# Patient Record
Sex: MALE | Race: WHITE | NOT HISPANIC OR LATINO | Employment: FULL TIME | URBAN - METROPOLITAN AREA
[De-identification: names, ages, dates, MRNs, and addresses within clinical notes are randomized per-mention and may not be internally consistent; named-entity substitution may affect disease eponyms.]

---

## 2017-12-01 ENCOUNTER — OFFICE VISIT (OUTPATIENT)
Dept: FAMILY MEDICINE | Facility: CLINIC | Age: 47
End: 2017-12-01
Payer: COMMERCIAL

## 2017-12-01 ENCOUNTER — LAB VISIT (OUTPATIENT)
Dept: LAB | Facility: HOSPITAL | Age: 47
End: 2017-12-01
Attending: FAMILY MEDICINE
Payer: COMMERCIAL

## 2017-12-01 VITALS
HEIGHT: 69 IN | DIASTOLIC BLOOD PRESSURE: 84 MMHG | HEART RATE: 76 BPM | WEIGHT: 229.5 LBS | SYSTOLIC BLOOD PRESSURE: 128 MMHG | BODY MASS INDEX: 33.99 KG/M2 | OXYGEN SATURATION: 97 % | TEMPERATURE: 98 F

## 2017-12-01 DIAGNOSIS — B35.1 ONYCHOMYCOSIS: ICD-10-CM

## 2017-12-01 DIAGNOSIS — S22.31XD CLOSED FRACTURE OF ONE RIB OF RIGHT SIDE WITH ROUTINE HEALING: ICD-10-CM

## 2017-12-01 DIAGNOSIS — Z00.00 ANNUAL PHYSICAL EXAM: Primary | ICD-10-CM

## 2017-12-01 DIAGNOSIS — Z00.00 ANNUAL PHYSICAL EXAM: ICD-10-CM

## 2017-12-01 LAB
ALBUMIN SERPL BCP-MCNC: 3.8 G/DL
ALP SERPL-CCNC: 59 U/L
ALT SERPL W/O P-5'-P-CCNC: 57 U/L
ANION GAP SERPL CALC-SCNC: 6 MMOL/L
AST SERPL-CCNC: 26 U/L
BASOPHILS # BLD AUTO: 0.03 K/UL
BASOPHILS NFR BLD: 0.4 %
BILIRUB SERPL-MCNC: 0.5 MG/DL
BUN SERPL-MCNC: 11 MG/DL
CALCIUM SERPL-MCNC: 10.4 MG/DL
CHLORIDE SERPL-SCNC: 108 MMOL/L
CHOLEST SERPL-MCNC: 190 MG/DL
CHOLEST/HDLC SERPL: 4.9 {RATIO}
CO2 SERPL-SCNC: 26 MMOL/L
CREAT SERPL-MCNC: 0.8 MG/DL
DIFFERENTIAL METHOD: ABNORMAL
EOSINOPHIL # BLD AUTO: 0.2 K/UL
EOSINOPHIL NFR BLD: 2.8 %
ERYTHROCYTE [DISTWIDTH] IN BLOOD BY AUTOMATED COUNT: 13.8 %
EST. GFR  (AFRICAN AMERICAN): >60 ML/MIN/1.73 M^2
EST. GFR  (NON AFRICAN AMERICAN): >60 ML/MIN/1.73 M^2
GLUCOSE SERPL-MCNC: 80 MG/DL
HCT VFR BLD AUTO: 42.5 %
HDLC SERPL-MCNC: 39 MG/DL
HDLC SERPL: 20.5 %
HGB BLD-MCNC: 14.2 G/DL
LDLC SERPL CALC-MCNC: 129.6 MG/DL
LYMPHOCYTES # BLD AUTO: 2.8 K/UL
LYMPHOCYTES NFR BLD: 35 %
MCH RBC QN AUTO: 30.9 PG
MCHC RBC AUTO-ENTMCNC: 33.4 G/DL
MCV RBC AUTO: 93 FL
MONOCYTES # BLD AUTO: 0.6 K/UL
MONOCYTES NFR BLD: 8.1 %
NEUTROPHILS # BLD AUTO: 4.2 K/UL
NEUTROPHILS NFR BLD: 53.7 %
NONHDLC SERPL-MCNC: 151 MG/DL
PLATELET # BLD AUTO: 260 K/UL
PMV BLD AUTO: 10.9 FL
POTASSIUM SERPL-SCNC: 4.1 MMOL/L
PROT SERPL-MCNC: 7.2 G/DL
RBC # BLD AUTO: 4.59 M/UL
SODIUM SERPL-SCNC: 140 MMOL/L
TRIGL SERPL-MCNC: 107 MG/DL
TSH SERPL DL<=0.005 MIU/L-ACNC: 1.9 UIU/ML
WBC # BLD AUTO: 7.89 K/UL

## 2017-12-01 PROCEDURE — 99396 PREV VISIT EST AGE 40-64: CPT | Mod: S$GLB,,, | Performed by: FAMILY MEDICINE

## 2017-12-01 PROCEDURE — 80061 LIPID PANEL: CPT

## 2017-12-01 PROCEDURE — 99999 PR PBB SHADOW E&M-EST. PATIENT-LVL III: CPT | Mod: PBBFAC,,, | Performed by: FAMILY MEDICINE

## 2017-12-01 PROCEDURE — 80053 COMPREHEN METABOLIC PANEL: CPT

## 2017-12-01 PROCEDURE — 84443 ASSAY THYROID STIM HORMONE: CPT

## 2017-12-01 PROCEDURE — 36415 COLL VENOUS BLD VENIPUNCTURE: CPT | Mod: PO

## 2017-12-01 PROCEDURE — 85025 COMPLETE CBC W/AUTO DIFF WBC: CPT

## 2017-12-01 RX ORDER — TERBINAFINE HYDROCHLORIDE 250 MG/1
250 TABLET ORAL DAILY
Qty: 90 TABLET | Refills: 0 | Status: SHIPPED | OUTPATIENT
Start: 2017-12-01 | End: 2017-12-31

## 2017-12-01 RX ORDER — ACETAMINOPHEN AND CODEINE PHOSPHATE 300; 30 MG/1; MG/1
TABLET ORAL
COMMUNITY
Start: 2017-10-24 | End: 2018-03-20

## 2017-12-01 RX ORDER — NAPROXEN 500 MG/1
TABLET ORAL
COMMUNITY
Start: 2017-10-24 | End: 2018-04-04

## 2017-12-01 NOTE — PROGRESS NOTES
Subjective:       Patient ID: Juan Couch is a 47 y.o. male.    Chief Complaint: Annual Exam and Letter for School/Work    Patient presents for annual exam. He has fractured his ribs and feels fine. He did this 6 weeks ago and is ready to go back to work. He feels fine and is ready to return.    He was also treated for onychomycosis and feels it is returning and would like re treatment.   He gets dental visits 2 times a year.     History reviewed. No pertinent past medical history.   Past Surgical History:  No date: FRACTURE SURGERY      Comment: tibia, fibula, forarm  Review of patient's family history indicates:    Cancer                         Mother                      Comment: uterine    Social History    Marital status:              Spouse name:                       Years of education:                 Number of children:               Occupational History    None on file    Social History Main Topics    Smoking status: Current Every Day Smoker                                                     Packs/day: 0.75      Years: 22.00          Types: Cigarettes       Smokeless tobacco: Not on file                       Alcohol use: Yes           0.5 oz/week       Standard drinks or equivalent: 1 per week    Drug use: Not on file     Sexual activity: Not on file          Other Topics            Concern    None on file    Social History Narrative    Works as a . He has one daughter.            Review of Systems   Constitutional: Negative for fatigue.   HENT: Negative for hearing loss, rhinorrhea, sneezing and sore throat.    Eyes: Negative for visual disturbance.   Respiratory: Negative for cough, chest tightness, shortness of breath and wheezing.    Cardiovascular: Negative for chest pain, palpitations and leg swelling.   Gastrointestinal: Negative for abdominal pain, constipation, diarrhea, nausea and vomiting.   Endocrine: Negative for polydipsia, polyphagia and polyuria.   Genitourinary:  "Negative for dysuria, frequency, hematuria and urgency.   Musculoskeletal: Negative for arthralgias and back pain.   Skin: Negative for rash.   Neurological: Negative for dizziness, syncope, light-headedness and headaches.       Objective:       Vitals:    12/01/17 1004   BP: 128/84   Pulse: 76   Temp: 97.9 °F (36.6 °C)   TempSrc: Oral   SpO2: 97%   Weight: 104.1 kg (229 lb 8 oz)   Height: 5' 9" (1.753 m)       Physical Exam   Constitutional: He appears well-developed and well-nourished. No distress.   HENT:   Head: Normocephalic.   Right Ear: External ear normal.   Left Ear: External ear normal.   Mouth/Throat: Oropharynx is clear and moist. No oropharyngeal exudate.   Eyes: Conjunctivae are normal.   Neck: Normal range of motion. Neck supple. No thyromegaly present.   Cardiovascular: Normal rate, regular rhythm and normal heart sounds.  Exam reveals no gallop and no friction rub.    No murmur heard.  Pulmonary/Chest: Effort normal and breath sounds normal. No respiratory distress. He has no wheezes. He has no rales.   Abdominal: Soft. Bowel sounds are normal. He exhibits no distension and no mass. There is no tenderness. There is no rebound and no guarding.   Musculoskeletal: He exhibits no edema.   Lymphadenopathy:     He has no cervical adenopathy.   Neurological: He is alert.   Skin: No rash noted. He is not diaphoretic.   Psychiatric: He has a normal mood and affect.       Assessment:       1. Annual physical exam    2. Closed fracture of one rib of right side with routine healing    3. Onychomycosis        Plan:       Juan was seen today for annual exam and letter for school/work.    Diagnoses and all orders for this visit:    Annual physical exam  -     Comprehensive metabolic panel; Future  -     Lipid panel; Future  -     TSH; Future  -     CBC auto differential; Future    Closed fracture of one rib of right side with routine healing    Onychomycosis  -     terbinafine HCl (LAMISIL) 250 mg tablet; Take 1 " tablet (250 mg total) by mouth once daily.      .       Pt was seen on 10/24/17 and diagnosed at [facility] urgent care for a rib fracture and ws given a note by me to return  On 12/4/17.

## 2017-12-04 ENCOUNTER — PATIENT MESSAGE (OUTPATIENT)
Dept: FAMILY MEDICINE | Facility: CLINIC | Age: 47
End: 2017-12-04

## 2018-02-28 ENCOUNTER — TELEPHONE (OUTPATIENT)
Dept: FAMILY MEDICINE | Facility: CLINIC | Age: 48
End: 2018-02-28

## 2018-02-28 DIAGNOSIS — Z72.0 TOBACCO ABUSE: Primary | ICD-10-CM

## 2018-02-28 NOTE — TELEPHONE ENCOUNTER
----- Message from Tammy Doyle sent at 2/28/2018  2:59 PM CST -----  Contact: Self   Patient is requesting a Nicotine Gum or anything Smoking cessation scripts available that can be covered by his insurance. Please call at [phone].

## 2018-03-20 ENCOUNTER — CLINICAL SUPPORT (OUTPATIENT)
Dept: SMOKING CESSATION | Facility: CLINIC | Age: 48
End: 2018-03-20
Payer: COMMERCIAL

## 2018-03-20 DIAGNOSIS — F17.200 NICOTINE DEPENDENCE: Primary | ICD-10-CM

## 2018-03-20 PROCEDURE — 99404 PREV MED CNSL INDIV APPRX 60: CPT | Mod: S$GLB,,,

## 2018-03-20 PROCEDURE — 99999 PR PBB SHADOW E&M-EST. PATIENT-LVL I: CPT | Mod: PBBFAC,,,

## 2018-03-20 RX ORDER — BUPROPION HYDROCHLORIDE 150 MG/1
TABLET, EXTENDED RELEASE ORAL
Qty: 60 TABLET | Refills: 0 | Status: SHIPPED | OUTPATIENT
Start: 2018-03-20 | End: 2018-04-04

## 2018-03-20 RX ORDER — IBUPROFEN 200 MG
1 TABLET ORAL DAILY
Qty: 14 PATCH | Refills: 0 | Status: SHIPPED | OUTPATIENT
Start: 2018-03-20 | End: 2018-04-13 | Stop reason: SDUPTHER

## 2018-04-04 ENCOUNTER — TELEPHONE (OUTPATIENT)
Dept: SMOKING CESSATION | Facility: CLINIC | Age: 48
End: 2018-04-04

## 2018-04-04 NOTE — TELEPHONE ENCOUNTER
Smoking [facility]essation [facility]- called to check on patient, no show for intake appointment. Left message to call back to reschedule [phone] or  [phone].

## 2018-04-13 DIAGNOSIS — F17.200 NICOTINE DEPENDENCE: Primary | ICD-10-CM

## 2018-04-13 RX ORDER — IBUPROFEN 200 MG
1 TABLET ORAL DAILY
Qty: 14 PATCH | Refills: 0 | Status: SHIPPED | OUTPATIENT
Start: 2018-04-13 | End: 2018-04-17 | Stop reason: SDUPTHER

## 2018-04-17 ENCOUNTER — CLINICAL SUPPORT (OUTPATIENT)
Dept: SMOKING CESSATION | Facility: CLINIC | Age: 48
End: 2018-04-17
Payer: COMMERCIAL

## 2018-04-17 DIAGNOSIS — F17.200 NICOTINE DEPENDENCE: Primary | ICD-10-CM

## 2018-04-17 PROCEDURE — 90853 GROUP PSYCHOTHERAPY: CPT | Mod: S$GLB,,,

## 2018-04-17 RX ORDER — IBUPROFEN 200 MG
1 TABLET ORAL DAILY
Qty: 14 PATCH | Refills: 0 | Status: SHIPPED | OUTPATIENT
Start: 2018-04-17 | End: 2018-05-01 | Stop reason: SDUPTHER

## 2018-04-17 RX ORDER — DM/P-EPHED/ACETAMINOPH/DOXYLAM 30-7.5/3
2 LIQUID (ML) ORAL
Qty: 144 LOZENGE | Refills: 0 | Status: SHIPPED | OUTPATIENT
Start: 2018-04-17 | End: 2018-06-19

## 2018-04-17 NOTE — PROGRESS NOTES
Smoking Cessation Group Session #1    [facility]  Date: 4/17/18  Clinical Status of Patient: Outpatient   Length of Service and Code: 60 minutes - 34463   Number in Attendance: 2  Group Activities/Focus of Group:  Sharing last weeks challenges, triggers, and coping activities to remain quit and/ or keep making progress toward cessation, completion of TCRS (Tobacco Cessation Rating Scale) learned addiction model, personal reasons for quitting, medications, goals, quit date.        Target symptoms:  withdrawal and medication side effects             The following were rated moderate (3) to severe (4) on TCRS:       Moderate 3: none      Severe 4:   none  Patient's Response to Intervention: Active participation, self-disclosure, supportive of group and peers.   Pt  continues to smoke 10 cigs/day. Pt is no longer taking  tobacco cessation medication of 150 mg Wellbutrin SR  BID, he said he just didn't like it. He remains on 21 mg nicotine patch QD.   No adverse effects/mental changes noted at this time. Discussed symptoms rated as 3 or 4 on TCRS scale : craving , added 2 mg nicotine lozenge.  Pt asked to reduce smoking rate by 2 cigs/day. Pt's exhaled carbon monoxide level was 10 ppm per smokerlyzer (0-6 ppm non-smoker). Will see pt back in group 2 weeks.    Progress Toward Goals and Other Mental Status Changes: Pt will continue with rate reduction plan and wait times prior to smoking. The patient denies any abnormal behavioral or mental changes at this time.      Diagnosis: Z72.0  Plan:The patient will continue with group therapy sessions and tobacco cessation medication monitoring by CTTS.   Return to Clinic: 2 weeks

## 2018-04-24 ENCOUNTER — TELEPHONE (OUTPATIENT)
Dept: FAMILY MEDICINE | Facility: CLINIC | Age: 48
End: 2018-04-24

## 2018-04-24 NOTE — TELEPHONE ENCOUNTER
----- Message from Isadora Biggs sent at 4/24/2018  2:28 PM CDT -----  Contact: Self   Patient says his medication didn't really work for him and he would like to know if he can get a refill or another medication. Please call patient at [phone].        terbinafine (LAMISIL) 250 mg tablet    [facility] [place] [address] AT [address]

## 2018-04-24 NOTE — TELEPHONE ENCOUNTER
Patient stated he feels that medication - Lamisil - is not working.  Stated he used the amount prescribed and a refill with not change in situation.  Would like to know if Dr. Santana would recommend a repeat of the medication at an increased dose or if another medication can be prescribed.  Please advise.

## 2018-05-01 ENCOUNTER — CLINICAL SUPPORT (OUTPATIENT)
Dept: SMOKING CESSATION | Facility: CLINIC | Age: 48
End: 2018-05-01
Payer: COMMERCIAL

## 2018-05-01 DIAGNOSIS — F17.200 NICOTINE DEPENDENCE: Primary | ICD-10-CM

## 2018-05-01 PROCEDURE — 99407 BEHAV CHNG SMOKING > 10 MIN: CPT | Mod: S$GLB,,,

## 2018-05-01 RX ORDER — IBUPROFEN 200 MG
1 TABLET ORAL DAILY
Qty: 14 PATCH | Refills: 0 | Status: SHIPPED | OUTPATIENT
Start: 2018-05-01 | End: 2018-05-31

## 2018-05-08 ENCOUNTER — CLINICAL SUPPORT (OUTPATIENT)
Dept: SMOKING CESSATION | Facility: CLINIC | Age: 48
End: 2018-05-08
Payer: COMMERCIAL

## 2018-05-08 DIAGNOSIS — F17.200 NICOTINE DEPENDENCE: Primary | ICD-10-CM

## 2018-05-08 PROCEDURE — 99407 BEHAV CHNG SMOKING > 10 MIN: CPT | Mod: S$GLB,,,

## 2018-06-04 ENCOUNTER — CLINICAL SUPPORT (OUTPATIENT)
Dept: SMOKING CESSATION | Facility: CLINIC | Age: 48
End: 2018-06-04
Payer: COMMERCIAL

## 2018-06-04 DIAGNOSIS — F17.200 NICOTINE DEPENDENCE: Primary | ICD-10-CM

## 2018-06-04 PROCEDURE — 99407 BEHAV CHNG SMOKING > 10 MIN: CPT | Mod: S$GLB,,,

## 2018-06-04 RX ORDER — IBUPROFEN 200 MG
1 TABLET ORAL DAILY
Qty: 14 PATCH | Refills: 0 | Status: SHIPPED | OUTPATIENT
Start: 2018-06-04 | End: 2018-06-19 | Stop reason: SDUPTHER

## 2018-06-19 ENCOUNTER — CLINICAL SUPPORT (OUTPATIENT)
Dept: SMOKING CESSATION | Facility: CLINIC | Age: 48
End: 2018-06-19
Payer: COMMERCIAL

## 2018-06-19 DIAGNOSIS — F17.200 NICOTINE DEPENDENCE: ICD-10-CM

## 2018-06-19 PROCEDURE — 99403 PREV MED CNSL INDIV APPRX 45: CPT | Mod: S$GLB,,,

## 2018-06-19 PROCEDURE — 99999 PR PBB SHADOW E&M-EST. PATIENT-LVL II: CPT | Mod: PBBFAC,,,

## 2018-06-19 RX ORDER — MICONAZOLE NITRATE 2 %
CREAM (GRAM) TOPICAL
Qty: 170 EACH | Refills: 0 | Status: SHIPPED | OUTPATIENT
Start: 2018-06-19 | End: 2019-04-30

## 2018-06-19 RX ORDER — IBUPROFEN 200 MG
1 TABLET ORAL DAILY
Qty: 28 PATCH | Refills: 0 | Status: SHIPPED | OUTPATIENT
Start: 2018-06-19 | End: 2018-07-17

## 2018-06-22 NOTE — PROGRESS NOTES
Individual Follow-Up Form    6/22/2018    Quit Date: 7/1/18    Clinical Status of Patient: Outpatient    Length of Service: 45 minutes    Continuing Medication: yes  Patches    Other Medications: gum     Target Symptoms: Withdrawal and medication side effects. The following were  rated moderate (3) to severe (4) on TCRS:  · Moderate (3): none  · Severe (4): none    Comments: Pt continues to smoke 2 cigs/day. Pt remains on tobacco cessation medication of  21 mg nicotine patch QD and 2mg nicotine lozenge PRN (1-2 per hour in place of cigarettes.) No adverse effects noted at this time. Pt doing well with rate reduction and wait times prior to smoking. Reviewed coping strategies/habitual behavior/relapse prevention with patient. Exhaled carbon monoxide level was () ppm per Smokerlyzer  ( non- smoker = 0-5 ppm .)  Will see pt back in office in 2 weeks      Diagnosis: F17.200    Next Visit: 2 weeks

## 2018-07-10 ENCOUNTER — TELEPHONE (OUTPATIENT)
Dept: SMOKING CESSATION | Facility: CLINIC | Age: 48
End: 2018-07-10

## 2018-07-16 ENCOUNTER — TELEPHONE (OUTPATIENT)
Dept: SMOKING CESSATION | Facility: CLINIC | Age: 48
End: 2018-07-16

## 2018-07-28 ENCOUNTER — CLINICAL SUPPORT (OUTPATIENT)
Dept: SMOKING CESSATION | Facility: CLINIC | Age: 48
End: 2018-07-28
Payer: COMMERCIAL

## 2018-07-28 DIAGNOSIS — F17.200 NICOTINE DEPENDENCE: Primary | ICD-10-CM

## 2018-07-28 PROCEDURE — 99407 BEHAV CHNG SMOKING > 10 MIN: CPT | Mod: S$GLB,,, | Performed by: INTERNAL MEDICINE

## 2018-07-28 NOTE — PROGRESS NOTES
Spoke with patient today in regard to smoking cessation progress for 3 month follow up, he states not tobacco free. Patient states he was able to become 80% quit, he is about to go on vacation and not ready to schedule an appointment to return to the program at this time. Commended patient on the accomplishment. Informed patient of benefit period, future follow ups, and contact information if any further help or support is needed. Will complete smart form for 3 month follow up on Quit attempt #1.

## 2018-10-29 ENCOUNTER — OFFICE VISIT (OUTPATIENT)
Dept: FAMILY MEDICINE | Facility: CLINIC | Age: 48
End: 2018-10-29
Payer: COMMERCIAL

## 2018-10-29 VITALS
WEIGHT: 231.5 LBS | BODY MASS INDEX: 34.29 KG/M2 | OXYGEN SATURATION: 97 % | TEMPERATURE: 98 F | DIASTOLIC BLOOD PRESSURE: 70 MMHG | HEIGHT: 69 IN | HEART RATE: 84 BPM | SYSTOLIC BLOOD PRESSURE: 124 MMHG

## 2018-10-29 DIAGNOSIS — Z00.00 ANNUAL PHYSICAL EXAM: ICD-10-CM

## 2018-10-29 DIAGNOSIS — J20.9 ACUTE BRONCHITIS, UNSPECIFIED ORGANISM: Primary | ICD-10-CM

## 2018-10-29 PROCEDURE — 90471 IMMUNIZATION ADMIN: CPT | Mod: S$GLB,,, | Performed by: FAMILY MEDICINE

## 2018-10-29 PROCEDURE — 90732 PPSV23 VACC 2 YRS+ SUBQ/IM: CPT | Mod: S$GLB,,, | Performed by: FAMILY MEDICINE

## 2018-10-29 PROCEDURE — 99999 PR PBB SHADOW E&M-EST. PATIENT-LVL III: CPT | Mod: PBBFAC,,, | Performed by: FAMILY MEDICINE

## 2018-10-29 PROCEDURE — 99396 PREV VISIT EST AGE 40-64: CPT | Mod: 25,S$GLB,, | Performed by: FAMILY MEDICINE

## 2018-10-29 RX ORDER — DOXYCYCLINE HYCLATE 100 MG
100 TABLET ORAL 2 TIMES DAILY
Qty: 14 TABLET | Refills: 0 | Status: SHIPPED | OUTPATIENT
Start: 2018-10-29 | End: 2019-11-05 | Stop reason: ALTCHOICE

## 2018-10-29 NOTE — PROGRESS NOTES
"Subjective:       Patient ID: Juan Couch is a 48 y.o. male.    Chief Complaint: Annual Exam    Patient presents for an annual exam. He is trying to quit smoking. He has been coughing for the last week. He has been coughing up brown phlegm. He has no fever or chills.       Review of Systems   Constitutional: Negative for activity change and unexpected weight change.   HENT: Positive for hearing loss and rhinorrhea. Negative for trouble swallowing.    Eyes: Negative for discharge and visual disturbance.   Respiratory: Positive for cough. Negative for chest tightness, shortness of breath and wheezing.    Cardiovascular: Negative for chest pain, palpitations and leg swelling.   Gastrointestinal: Negative for abdominal pain, blood in stool, constipation, diarrhea, nausea and vomiting.   Endocrine: Negative for polydipsia and polyuria.   Genitourinary: Negative for difficulty urinating, hematuria and urgency.   Musculoskeletal: Negative for arthralgias, joint swelling and neck pain.   Neurological: Negative for dizziness, syncope, weakness, light-headedness and headaches.   Psychiatric/Behavioral: Negative for confusion and dysphoric mood.       Objective:       Vitals:    10/29/18 1509   BP: 124/70   Pulse: 84   Temp: 98.2 °F (36.8 °C)   TempSrc: Oral   SpO2: 97%   Weight: 105 kg (231 lb 7.7 oz)   Height: 5' 9" (1.753 m)       Physical Exam   Constitutional: He is oriented to person, place, and time. He appears well-developed and well-nourished. No distress.   HENT:   Head: Normocephalic.   Right Ear: External ear normal.   Left Ear: External ear normal.   Mouth/Throat: Oropharynx is clear and moist. No oropharyngeal exudate.   Eyes: Conjunctivae are normal.   Neck: Normal range of motion. Neck supple. No thyromegaly present.   Cardiovascular: Normal rate, regular rhythm and normal heart sounds. Exam reveals no gallop and no friction rub.   No murmur heard.  Pulmonary/Chest: Effort normal and breath sounds normal. No " stridor. No respiratory distress. He has no wheezes. He has no rales.   Abdominal: Soft. Bowel sounds are normal. He exhibits no distension and no mass. There is no tenderness. There is no rebound and no guarding.   Musculoskeletal: He exhibits no edema.   Lymphadenopathy:     He has no cervical adenopathy.   Neurological: He is alert and oriented to person, place, and time.   Skin: No rash noted. He is not diaphoretic.   Psychiatric: He has a normal mood and affect.       Assessment:       1. Acute bronchitis, unspecified organism    2. Annual physical exam        Plan:       Nelly Martinez was seen today for annual exam.    Diagnoses and all orders for this visit:    Acute bronchitis, unspecified organism  -     doxycycline (VIBRA-TABS) 100 MG tablet; Take 1 tablet (100 mg total) by mouth 2 (two) times daily.    Annual physical exam  -     CBC auto differential; Future  -     Lipid panel; Future  -     Comprehensive metabolic panel; Future    Other orders  -     Influenza - Quadrivalent (3 years & older) (PF)  -     (In Office Administered) Pneumococcal Polysaccharide Vaccine (23 Valent) (SQ/IM)      \

## 2018-11-07 ENCOUNTER — LAB VISIT (OUTPATIENT)
Dept: LAB | Facility: HOSPITAL | Age: 48
End: 2018-11-07
Attending: FAMILY MEDICINE
Payer: COMMERCIAL

## 2018-11-07 DIAGNOSIS — Z00.00 ANNUAL PHYSICAL EXAM: ICD-10-CM

## 2018-11-07 LAB
ALBUMIN SERPL BCP-MCNC: 4 G/DL
ALP SERPL-CCNC: 57 U/L
ALT SERPL W/O P-5'-P-CCNC: 64 U/L
ANION GAP SERPL CALC-SCNC: 6 MMOL/L
AST SERPL-CCNC: 27 U/L
BASOPHILS # BLD AUTO: 0.06 K/UL
BASOPHILS NFR BLD: 0.7 %
BILIRUB SERPL-MCNC: 0.6 MG/DL
BUN SERPL-MCNC: 13 MG/DL
CALCIUM SERPL-MCNC: 11.4 MG/DL
CHLORIDE SERPL-SCNC: 108 MMOL/L
CHOLEST SERPL-MCNC: 201 MG/DL
CHOLEST/HDLC SERPL: 5.3 {RATIO}
CO2 SERPL-SCNC: 26 MMOL/L
CREAT SERPL-MCNC: 0.9 MG/DL
DIFFERENTIAL METHOD: NORMAL
EOSINOPHIL # BLD AUTO: 0.2 K/UL
EOSINOPHIL NFR BLD: 2.7 %
ERYTHROCYTE [DISTWIDTH] IN BLOOD BY AUTOMATED COUNT: 13.8 %
EST. GFR  (AFRICAN AMERICAN): >60 ML/MIN/1.73 M^2
EST. GFR  (NON AFRICAN AMERICAN): >60 ML/MIN/1.73 M^2
GLUCOSE SERPL-MCNC: 93 MG/DL
HCT VFR BLD AUTO: 42.4 %
HDLC SERPL-MCNC: 38 MG/DL
HDLC SERPL: 18.9 %
HGB BLD-MCNC: 14.3 G/DL
LDLC SERPL CALC-MCNC: 137.6 MG/DL
LYMPHOCYTES # BLD AUTO: 2.7 K/UL
LYMPHOCYTES NFR BLD: 33.7 %
MCH RBC QN AUTO: 30.6 PG
MCHC RBC AUTO-ENTMCNC: 33.7 G/DL
MCV RBC AUTO: 91 FL
MONOCYTES # BLD AUTO: 0.5 K/UL
MONOCYTES NFR BLD: 6.7 %
NEUTROPHILS # BLD AUTO: 4.5 K/UL
NEUTROPHILS NFR BLD: 56.2 %
NONHDLC SERPL-MCNC: 163 MG/DL
PLATELET # BLD AUTO: 276 K/UL
PMV BLD AUTO: 11.4 FL
POTASSIUM SERPL-SCNC: 4.2 MMOL/L
PROT SERPL-MCNC: 7.3 G/DL
RBC # BLD AUTO: 4.68 M/UL
SODIUM SERPL-SCNC: 140 MMOL/L
TRIGL SERPL-MCNC: 127 MG/DL
WBC # BLD AUTO: 8.07 K/UL

## 2018-11-07 PROCEDURE — 80061 LIPID PANEL: CPT

## 2018-11-07 PROCEDURE — 36415 COLL VENOUS BLD VENIPUNCTURE: CPT | Mod: PO

## 2018-11-07 PROCEDURE — 85025 COMPLETE CBC W/AUTO DIFF WBC: CPT

## 2018-11-07 PROCEDURE — 80053 COMPREHEN METABOLIC PANEL: CPT

## 2018-11-08 ENCOUNTER — TELEPHONE (OUTPATIENT)
Dept: FAMILY MEDICINE | Facility: CLINIC | Age: 48
End: 2018-11-08

## 2018-11-08 DIAGNOSIS — E83.52 HYPERCALCEMIA: ICD-10-CM

## 2018-11-08 DIAGNOSIS — R79.89 ELEVATED LFTS: Primary | ICD-10-CM

## 2018-11-08 NOTE — TELEPHONE ENCOUNTER
----- Message from Odessa Santana MD sent at 11/7/2018  4:28 PM CST -----  YES, YES, AND MONITOR LFT

## 2018-11-08 NOTE — TELEPHONE ENCOUNTER
Patients calcium was high so we need to check her vit d and PTH  Liver enzyme high so repeat in 2 months. Avoid excessive use of tylenol products and alcohol.       Notes recorded by Odessa Santana MD on 11/7/2018 at 4:28 PM CST  YES, YES, AND MONITOR LFT  ------    Notes recorded by DEANDRA Stephen on 11/7/2018 at 2:40 PM CST  Vit d? Pth? LFT stable so just watch it?

## 2018-11-15 ENCOUNTER — TELEPHONE (OUTPATIENT)
Dept: FAMILY MEDICINE | Facility: CLINIC | Age: 48
End: 2018-11-15

## 2018-11-15 NOTE — TELEPHONE ENCOUNTER
----- Message from Medina Briones sent at 11/14/2018  4:53 PM CST -----  Contact: pt            Name of Who is Calling: pt      What is the request in detail: pt returned the nurse's phone call. Call pt      Can the clinic reply by [facility]: no      What Number to Call Back if not in [facility]: [phone]

## 2018-11-16 ENCOUNTER — LAB VISIT (OUTPATIENT)
Dept: LAB | Facility: HOSPITAL | Age: 48
End: 2018-11-16
Attending: FAMILY MEDICINE
Payer: COMMERCIAL

## 2018-11-16 DIAGNOSIS — E83.52 HYPERCALCEMIA: ICD-10-CM

## 2018-11-16 LAB
25(OH)D3+25(OH)D2 SERPL-MCNC: 26 NG/ML
PTH-INTACT SERPL-MCNC: 115.1 PG/ML

## 2018-11-16 PROCEDURE — 83970 ASSAY OF PARATHORMONE: CPT

## 2018-11-16 PROCEDURE — 36415 COLL VENOUS BLD VENIPUNCTURE: CPT | Mod: PO

## 2018-11-16 PROCEDURE — 82306 VITAMIN D 25 HYDROXY: CPT

## 2018-11-19 DIAGNOSIS — E21.3 HYPERPARATHYROIDISM: Primary | ICD-10-CM

## 2018-11-19 DIAGNOSIS — E55.9 HYPOVITAMINOSIS D: ICD-10-CM

## 2018-11-19 RX ORDER — ERGOCALCIFEROL 1.25 MG/1
50000 CAPSULE ORAL
Qty: 12 CAPSULE | Refills: 0 | Status: SHIPPED | OUTPATIENT
Start: 2018-11-19 | End: 2019-11-15 | Stop reason: SDUPTHER

## 2018-12-06 ENCOUNTER — OFFICE VISIT (OUTPATIENT)
Dept: ENDOCRINOLOGY | Facility: CLINIC | Age: 48
End: 2018-12-06
Payer: COMMERCIAL

## 2018-12-06 VITALS
DIASTOLIC BLOOD PRESSURE: 80 MMHG | WEIGHT: 231.5 LBS | HEIGHT: 69 IN | BODY MASS INDEX: 34.29 KG/M2 | SYSTOLIC BLOOD PRESSURE: 140 MMHG | RESPIRATION RATE: 18 BRPM | HEART RATE: 76 BPM

## 2018-12-06 DIAGNOSIS — E55.9 VITAMIN D DEFICIENCY: ICD-10-CM

## 2018-12-06 DIAGNOSIS — E21.3 HYPERPARATHYROIDISM: Primary | ICD-10-CM

## 2018-12-06 PROCEDURE — 99999 PR PBB SHADOW E&M-EST. PATIENT-LVL III: CPT | Mod: PBBFAC,,, | Performed by: INTERNAL MEDICINE

## 2018-12-06 PROCEDURE — 99244 OFF/OP CNSLTJ NEW/EST MOD 40: CPT | Mod: S$GLB,,, | Performed by: INTERNAL MEDICINE

## 2018-12-06 NOTE — PROGRESS NOTES
ENDOCRINOLOGY INITIAL VISIT    Juan Couch is a 48 y.o. male referred by Danette Cuellar for evaluation of primary hyperparathyroidism.    Calcium first noted elevated in our system in 2014, highest calcium 11.4  PTH checked 11/2018 and found to be elevated at 115    He denies known history of hypercalcemia    Fatigue: some in midafternoon but otherwise denies  Anorexia/nausea/vomiting: denies  Constipation: denies  Cognitive/memory changes: +memory loss, difficulty with word recall over past few years  Mood changes: denies  Thirst: increased  Polyuria/polydipsia: increased throughout the day  Musculoskeletal pain: low back pain over past few weeks; had muscle spasms initially but those have now resolved    Fractures: broken rib after fall down stairs 11/2017  Kidney stones: denies    Thiazide/lithium use: denies    Calcium intake:   Supplements: denies   Diet: few servings  Vitamin D supplements: took 50,000 K four tabs in one week rather than weekly as misunderstood directions; last 2 weeks ago    History of XRT to head/neck: denies    Family history of hypercalcemia/hyperparathyroidism: denies    Family history of thyroid cancer, pituitary/pancreas/neuroendocrine/adrenal tumors: denies      REVIEW OF SYSTEMS  Constitutional: Weight stable but trying to lose. midafternoon fatigue  Temperature: Denies heat/cold intolerance  Eyes: No blurry vision, loss of vision, diplopia.  ENT: No voice alterations, no problems swallowing.  Thyroid: No masses in neck area.  CV: No chest pain, palpitations, normal blood pressure, no swelling of the lower extremities.  Pulm: No cough, no shortness of breath, no wheezing.  GI: No abdominal pain, no nausea/vomiting, regular bowel movements.  MSK: left lower flank pain  Neuro: No headaches. Memory loss  Skin: No skin lesions.  Endo:+ polyuria/polydipsia.    MEDICATION    Current Outpatient Medications:     ergocalciferol (ERGOCALCIFEROL) 50,000 unit Cap, Take 1 capsule (50,000 Units total)  "by mouth every 7 days., Disp: 12 capsule, Rfl: 0    doxycycline (VIBRA-TABS) 100 MG tablet, Take 1 tablet (100 mg total) by mouth 2 (two) times daily., Disp: 14 tablet, Rfl: 0    nicotine, polacrilex, (NICORETTE) 2 mg Gum, 1-2 per hour in place of cigarettes maximum of 15 per day., Disp: 170 each, Rfl: 0    ALLERGIES  Review of patient's allergies indicates:  No Known Allergies      PHYSICAL EXAM  BP (!) 140/80   Pulse 76   Resp 18   Ht 5' 9" (1.753 m)   Wt 105 kg (231 lb 7.7 oz)   BMI 34.18 kg/m²   Body mass index is 34.18 kg/m².  General Appearance:  Well appearing, pleasant, NAD  Skin:  no rashes or lesions  HEENT:  EOMI, MMM  Neck:  No thyromegaly or nodules, no LAD  Chest and Lungs:  CTAB, no wheezes/rales/rhonchi  Cardiovascular:  RRR, nml S1, S2.  No m/r/g  Extremities:  no lower extremity edema  Neurologic:  A&O x3  Psychiatric:  normal mood and affect    LABORATORY  Component      Latest Ref Rng & Units 11/16/2018 11/7/2018   Sodium      136 - 145 mmol/L  140   Potassium      3.5 - 5.1 mmol/L  4.2   Chloride      95 - 110 mmol/L  108   CO2      23 - 29 mmol/L  26   Glucose      70 - 110 mg/dL  93   BUN, Bld      6 - 20 mg/dL  13   Creatinine      0.5 - 1.4 mg/dL  0.9   Calcium      8.7 - 10.5 mg/dL  11.4 (H)   Total Protein      6.0 - 8.4 g/dL  7.3   Albumin      3.5 - 5.2 g/dL  4.0   Total Bilirubin      0.1 - 1.0 mg/dL  0.6   Alkaline Phosphatase      55 - 135 U/L  57   AST      10 - 40 U/L  27   ALT      10 - 44 U/L  64 (H)   Anion Gap      8 - 16 mmol/L  6 (L)   eGFR if African American      >60 mL/min/1.73 m:2  >60   eGFR if non African American      >60 mL/min/1.73 m:2  >60   Vit D, 25-Hydroxy      30 - 96 ng/mL 26 (L)    PTH      9.0 - 77.0 pg/mL 115.1 (H)        ASSESSMENT/PLAN  1. Hyperparathyroidism    2. Vitamin D deficiency      PTH-mediated hypercalcemia:   Suspect primary hyperparathyroidism  To confirm the diagnosis and in anticipation of surgery, I recommend:   - measure 24 hour urine " calcium and creatinine to exclude FHH although low suspicion for this given degree of calcium elevation  - Repeat renal function panel and iPTH on day completes urine collection    Then plan for:  - Neck ultrasound to evaluate for parathyroid adenoma and to look for presence of thyroid nodules   - Nuclear medicine parathyroid scan for localization.     We discussed that surgery is typically recommended in the presence of symptoms due to hypercalcemia or kidney stones.  In addition, the criteria for surgical intervention in asymptomatic primary hyperparathyroidism per the 2014 consensus guidelines include age <50; serum calcium 1 mg/dl or more above the upper limit of normal; creatinine clearance <60 ml/min, 24-hurine calcium excretion >400 mg/d and increased calcium-containing stone risk by biochemical stone risk analysis, or presence of nephrolithiasis or nephrocalcinosis by x-ray, ultrasound, or CT; or osteoporosis. Given his age and degree of calcium elevation, meets criteria. He is interested in surgery and following above work-up will refer.      Metabolic bone health:   Discontinue high dose vitamin D, start D3 1000 units daily  Recommend usual daily intake of calcium  Continue with water intake for hydration    RTC 6 months    Maren Strange MD

## 2018-12-06 NOTE — LETTER
December 6, 2018      DEANDRA Stephen  [address] [place] [address]           [facility] - Endocrinology  [address]  [place] [address]  Phone: [phone]          Patient: Juan Couch   MR Number: [id number]   YOB: 1970   Date of Visit: 12/6/2018       Dear Danette Cuellar:    Thank you for referring Juan Couch to me for evaluation. Attached you will find relevant portions of my assessment and plan of care.    If you have questions, please do not hesitate to call me. I look forward to following Juan Couch along with you.    Sincerely,    Maren Strange MD    Enclosure  CC:  No Recipients    If you would like to receive this communication electronically, please contact [email or web address] or [phone] to request more information on [employer] Link access.    For providers and/or their staff who would like to refer a patient to [facility], please contact us through our one-stop-shop provider referral line, Rolly Olmos, at [phone].    If you feel you have received this communication in error or would no longer like to receive these types of communications, please e-mail [email or web address]

## 2018-12-13 ENCOUNTER — LAB VISIT (OUTPATIENT)
Dept: LAB | Facility: HOSPITAL | Age: 48
End: 2018-12-13
Attending: INTERNAL MEDICINE
Payer: COMMERCIAL

## 2018-12-13 ENCOUNTER — TELEPHONE (OUTPATIENT)
Dept: FAMILY MEDICINE | Facility: CLINIC | Age: 48
End: 2018-12-13

## 2018-12-13 DIAGNOSIS — E55.9 HYPOVITAMINOSIS D: ICD-10-CM

## 2018-12-13 DIAGNOSIS — R79.89 ELEVATED LFTS: ICD-10-CM

## 2018-12-13 DIAGNOSIS — E21.3 HYPERPARATHYROIDISM: ICD-10-CM

## 2018-12-13 LAB
25(OH)D3+25(OH)D2 SERPL-MCNC: 24 NG/ML
ALBUMIN SERPL BCP-MCNC: 4 G/DL
ALBUMIN SERPL BCP-MCNC: 4.1 G/DL
ALP SERPL-CCNC: 58 U/L
ALT SERPL W/O P-5'-P-CCNC: 45 U/L
ANION GAP SERPL CALC-SCNC: 8 MMOL/L
AST SERPL-CCNC: 19 U/L
BILIRUB DIRECT SERPL-MCNC: 0.2 MG/DL
BILIRUB SERPL-MCNC: 0.4 MG/DL
BUN SERPL-MCNC: 21 MG/DL
CALCIUM 24H UR-MRATE: 13 MG/HR
CALCIUM SERPL-MCNC: 11.3 MG/DL
CALCIUM UR-MCNC: 25.1 MG/DL
CALCIUM URINE (MG/SPEC): 301 MG/SPEC
CHLORIDE SERPL-SCNC: 106 MMOL/L
CO2 SERPL-SCNC: 27 MMOL/L
CREAT 24H UR-MRATE: 58 MG/HR
CREAT SERPL-MCNC: 1.2 MG/DL
CREAT UR-MCNC: 116 MG/DL
CREATININE, URINE (MG/SPEC): 1392 MG/SPEC
EST. GFR  (AFRICAN AMERICAN): >60 ML/MIN/1.73 M^2
EST. GFR  (NON AFRICAN AMERICAN): >60 ML/MIN/1.73 M^2
GLUCOSE SERPL-MCNC: 111 MG/DL
PHOSPHATE SERPL-MCNC: 2.5 MG/DL
POTASSIUM SERPL-SCNC: 4.6 MMOL/L
PROT SERPL-MCNC: 7.6 G/DL
PTH-INTACT SERPL-MCNC: 153 PG/ML
SODIUM SERPL-SCNC: 141 MMOL/L
URINE COLLECTION DURATION: 24 HR
URINE COLLECTION DURATION: 24 HR
URINE VOLUME: 1200 ML
URINE VOLUME: 1200 ML

## 2018-12-13 PROCEDURE — 36415 COLL VENOUS BLD VENIPUNCTURE: CPT | Mod: PO

## 2018-12-13 PROCEDURE — 82570 ASSAY OF URINE CREATININE: CPT

## 2018-12-13 PROCEDURE — 80076 HEPATIC FUNCTION PANEL: CPT

## 2018-12-13 PROCEDURE — 82306 VITAMIN D 25 HYDROXY: CPT

## 2018-12-13 PROCEDURE — 83970 ASSAY OF PARATHORMONE: CPT

## 2018-12-13 PROCEDURE — 82340 ASSAY OF CALCIUM IN URINE: CPT

## 2018-12-13 PROCEDURE — 80069 RENAL FUNCTION PANEL: CPT

## 2018-12-13 NOTE — TELEPHONE ENCOUNTER
----- Message from Tammy Doyle sent at 12/13/2018  2:16 PM CST -----  Contact: Self   Pt is asking to speak with office in ref to having a liver test. Says he was told he would be contacted in a few weeks to follow up but havent heard anything back. Please call at [phone].

## 2018-12-13 NOTE — TELEPHONE ENCOUNTER
Returned call. LVM that Endocrinologist provider ordered the testing and left detailed message informing patient of number to scheduling dept ([phone]) to get scheduled for tests. Informed patient to give them a call to get scheduled.

## 2019-04-04 ENCOUNTER — CLINICAL SUPPORT (OUTPATIENT)
Dept: SMOKING CESSATION | Facility: CLINIC | Age: 49
End: 2019-04-04
Payer: COMMERCIAL

## 2019-04-04 DIAGNOSIS — F17.200 NICOTINE DEPENDENCE: Primary | ICD-10-CM

## 2019-04-04 PROCEDURE — 99407 PR TOBACCO USE CESSATION INTENSIVE >10 MINUTES: ICD-10-PCS | Mod: S$GLB,,,

## 2019-04-04 PROCEDURE — 99407 BEHAV CHNG SMOKING > 10 MIN: CPT | Mod: S$GLB,,,

## 2019-04-04 NOTE — PROGRESS NOTES
Spoke with patient today in regards to smoking cessation progress for 12 month follow up,  states not tobacco free at this time. Patient has scheduled an appointment for Quit #2 with his wife who is also not tobacco free at this time.. Informed patient of benefit period, future follow ups, and contact information. Will complete / resolve episode and complete smart form for Quit attempt #1.

## 2019-04-30 ENCOUNTER — CLINICAL SUPPORT (OUTPATIENT)
Dept: SMOKING CESSATION | Facility: CLINIC | Age: 49
End: 2019-04-30
Payer: COMMERCIAL

## 2019-04-30 DIAGNOSIS — F17.200 NICOTINE DEPENDENCE: Primary | ICD-10-CM

## 2019-04-30 PROCEDURE — 99999 PR PBB SHADOW E&M-EST. PATIENT-LVL I: CPT | Mod: PBBFAC,,,

## 2019-04-30 PROCEDURE — 99404 PR PREVENT COUNSEL,INDIV,60 MIN: ICD-10-PCS | Mod: S$GLB,,,

## 2019-04-30 PROCEDURE — 99999 PR PBB SHADOW E&M-EST. PATIENT-LVL I: ICD-10-PCS | Mod: PBBFAC,,,

## 2019-04-30 PROCEDURE — 99404 PREV MED CNSL INDIV APPRX 60: CPT | Mod: S$GLB,,,

## 2019-04-30 RX ORDER — MICONAZOLE NITRATE 2 %
CREAM (GRAM) TOPICAL
Qty: 170 EACH | Refills: 0 | Status: SHIPPED | OUTPATIENT
Start: 2019-04-30 | End: 2020-07-24 | Stop reason: SDUPTHER

## 2019-04-30 RX ORDER — BUPROPION HYDROCHLORIDE 150 MG/1
TABLET, EXTENDED RELEASE ORAL
Qty: 60 TABLET | Refills: 0 | Status: SHIPPED | OUTPATIENT
Start: 2019-04-30 | End: 2019-05-04 | Stop reason: SDUPTHER

## 2019-04-30 RX ORDER — IBUPROFEN 200 MG
1 TABLET ORAL DAILY
Qty: 28 PATCH | Refills: 0 | Status: SHIPPED | OUTPATIENT
Start: 2019-04-30 | End: 2019-05-04 | Stop reason: SDUPTHER

## 2019-04-30 NOTE — Clinical Note
Patient will be participating in biweekly tobacco cessation meetings and will begin the prescribed tobacco cessation medication regime of Wellbutrin SR BID and 21 mg nicotine patch QD  Patient denies any low moods or SI/HI .  He/She currently smokes 15-20 cigarettes per day.  Pt started on rate reduction and wait time of 15 min prior to smoking. Pt's exhaled carbon monoxide level was  26 ppm as per Smokerlyzer. (non- smoker = 0-5 ppm.) Will see pt back in office in 2 weeks.

## 2019-05-04 DIAGNOSIS — F17.200 NICOTINE DEPENDENCE: Primary | ICD-10-CM

## 2019-05-04 RX ORDER — BUPROPION HYDROCHLORIDE 150 MG/1
TABLET, EXTENDED RELEASE ORAL
Qty: 60 TABLET | Refills: 0 | Status: SHIPPED | OUTPATIENT
Start: 2019-05-04 | End: 2019-11-05

## 2019-05-04 RX ORDER — IBUPROFEN 200 MG
1 TABLET ORAL DAILY
Qty: 28 PATCH | Refills: 0 | Status: SHIPPED | OUTPATIENT
Start: 2019-05-04 | End: 2019-06-03

## 2019-05-14 ENCOUNTER — CLINICAL SUPPORT (OUTPATIENT)
Dept: SMOKING CESSATION | Facility: CLINIC | Age: 49
End: 2019-05-14
Payer: COMMERCIAL

## 2019-05-14 DIAGNOSIS — F17.200 NICOTINE DEPENDENCE: Primary | ICD-10-CM

## 2019-05-14 PROCEDURE — 99407 PR TOBACCO USE CESSATION INTENSIVE >10 MINUTES: ICD-10-PCS | Mod: S$GLB,,,

## 2019-05-14 PROCEDURE — 99407 BEHAV CHNG SMOKING > 10 MIN: CPT | Mod: S$GLB,,,

## 2019-10-11 ENCOUNTER — TELEPHONE (OUTPATIENT)
Dept: FAMILY MEDICINE | Facility: CLINIC | Age: 49
End: 2019-10-11

## 2019-10-11 DIAGNOSIS — E55.9 VITAMIN D DEFICIENCY: ICD-10-CM

## 2019-10-11 DIAGNOSIS — Z00.00 ANNUAL PHYSICAL EXAM: Primary | ICD-10-CM

## 2019-10-11 NOTE — TELEPHONE ENCOUNTER
Pt requesting lab orders!    Last Office Visit Info:   The patient's last visit with Odessa Santana MD was on 10/29/2018.    The patient's last visit in current department was on Visit date not found.        Last CBC Results:   Lab Results   Component Value Date    WBC 8.07 11/07/2018    HGB 14.3 11/07/2018    HCT 42.4 11/07/2018     11/07/2018       Last CMP Results  Lab Results   Component Value Date     12/13/2018    K 4.6 12/13/2018     12/13/2018    CO2 27 12/13/2018    BUN 21 (H) 12/13/2018    CREATININE 1.2 12/13/2018    CALCIUM 11.3 (H) 12/13/2018    ALBUMIN 4.1 12/13/2018    ALBUMIN 4.0 12/13/2018    AST 19 12/13/2018    ALT 45 (H) 12/13/2018       Last Lipids  Lab Results   Component Value Date    CHOL 201 (H) 11/07/2018    TRIG 127 11/07/2018    HDL 38 (L) 11/07/2018    LDLCALC 137.6 11/07/2018       Last A1C  No results found for: HGBA1C    Last TSH  Lab Results   Component Value Date    TSH 1.895 12/01/2017         Current Med Refills  Medication List with Changes/Refills   Current Medications    BUPROPION (WELLBUTRIN SR) 150 MG TBSR 12 HR TABLET    150mg once daily x3d then increase to twice daily       Start Date: 5/4/2019  End Date: --    DOXYCYCLINE (VIBRA-TABS) 100 MG TABLET    Take 1 tablet (100 mg total) by mouth 2 (two) times daily.       Start Date: 10/29/2018End Date: --    ERGOCALCIFEROL (ERGOCALCIFEROL) 50,000 UNIT CAP    Take 1 capsule (50,000 Units total) by mouth every 7 days.       Start Date: 11/19/2018End Date: --    NICOTINE, POLACRILEX, (NICORETTE) 2 MG GUM    1-2 per hour in place of a cigarette. Limit to 20 a day - oral. Please dispense cinnamon flavor       Start Date: 4/30/2019 End Date: --       Order(s) placed per written order guidelines: none    Please advise.

## 2019-10-11 NOTE — TELEPHONE ENCOUNTER
----- Message from Mandeep Biggs sent at 10/11/2019  9:52 AM CDT -----  Contact: Self  Type: Lab    Caller is requesting to schedule their Lab appointment prior to annual appointment.    Order is not listed in EPIC.  Please enter order and contact patient to schedule.    Name of Caller:Self    Preferred Date and Time of Labs:Morning    Date of EPP Appointment:11/05/2019    Where would they like the lab performed?[facility]    Would the patient rather a call back or a response via [facility]? Callback    Best Call Back Number:[phone]

## 2019-11-05 ENCOUNTER — OFFICE VISIT (OUTPATIENT)
Dept: FAMILY MEDICINE | Facility: CLINIC | Age: 49
End: 2019-11-05
Payer: COMMERCIAL

## 2019-11-05 ENCOUNTER — LAB VISIT (OUTPATIENT)
Dept: LAB | Facility: HOSPITAL | Age: 49
End: 2019-11-05
Attending: FAMILY MEDICINE
Payer: COMMERCIAL

## 2019-11-05 VITALS
WEIGHT: 211 LBS | HEIGHT: 69 IN | SYSTOLIC BLOOD PRESSURE: 112 MMHG | DIASTOLIC BLOOD PRESSURE: 76 MMHG | BODY MASS INDEX: 31.25 KG/M2 | OXYGEN SATURATION: 97 % | TEMPERATURE: 98 F | HEART RATE: 61 BPM

## 2019-11-05 DIAGNOSIS — Z00.00 ANNUAL PHYSICAL EXAM: ICD-10-CM

## 2019-11-05 DIAGNOSIS — Z12.11 COLON CANCER SCREENING: Primary | ICD-10-CM

## 2019-11-05 DIAGNOSIS — E55.9 VITAMIN D DEFICIENCY: ICD-10-CM

## 2019-11-05 DIAGNOSIS — Z00.00 ANNUAL PHYSICAL EXAM: Primary | ICD-10-CM

## 2019-11-05 DIAGNOSIS — R53.83 OTHER FATIGUE: ICD-10-CM

## 2019-11-05 DIAGNOSIS — Z12.11 COLON CANCER SCREENING: ICD-10-CM

## 2019-11-05 LAB
ALBUMIN SERPL BCP-MCNC: 4 G/DL (ref 3.5–5.2)
ALP SERPL-CCNC: 61 U/L (ref 55–135)
ALT SERPL W/O P-5'-P-CCNC: 25 U/L (ref 10–44)
ANION GAP SERPL CALC-SCNC: 4 MMOL/L (ref 8–16)
AST SERPL-CCNC: 16 U/L (ref 10–40)
BASOPHILS # BLD AUTO: 0.07 K/UL (ref 0–0.2)
BASOPHILS NFR BLD: 1 % (ref 0–1.9)
BILIRUB SERPL-MCNC: 0.6 MG/DL (ref 0.1–1)
BUN SERPL-MCNC: 11 MG/DL (ref 6–20)
CALCIUM SERPL-MCNC: 11.3 MG/DL (ref 8.7–10.5)
CHLORIDE SERPL-SCNC: 108 MMOL/L (ref 95–110)
CHOLEST SERPL-MCNC: 162 MG/DL (ref 120–199)
CHOLEST/HDLC SERPL: 4 {RATIO} (ref 2–5)
CO2 SERPL-SCNC: 30 MMOL/L (ref 23–29)
CREAT SERPL-MCNC: 0.9 MG/DL (ref 0.5–1.4)
DIFFERENTIAL METHOD: ABNORMAL
EOSINOPHIL # BLD AUTO: 0.2 K/UL (ref 0–0.5)
EOSINOPHIL NFR BLD: 2.5 % (ref 0–8)
ERYTHROCYTE [DISTWIDTH] IN BLOOD BY AUTOMATED COUNT: 13.7 % (ref 11.5–14.5)
EST. GFR  (AFRICAN AMERICAN): >60 ML/MIN/1.73 M^2
EST. GFR  (NON AFRICAN AMERICAN): >60 ML/MIN/1.73 M^2
GLUCOSE SERPL-MCNC: 103 MG/DL (ref 70–110)
HCT VFR BLD AUTO: 43.4 % (ref 40–54)
HDLC SERPL-MCNC: 41 MG/DL (ref 40–75)
HDLC SERPL: 25.3 % (ref 20–50)
HGB BLD-MCNC: 13.7 G/DL (ref 14–18)
IMM GRANULOCYTES # BLD AUTO: 0.02 K/UL (ref 0–0.04)
IMM GRANULOCYTES NFR BLD AUTO: 0.3 % (ref 0–0.5)
LDLC SERPL CALC-MCNC: 98.6 MG/DL (ref 63–159)
LYMPHOCYTES # BLD AUTO: 2.3 K/UL (ref 1–4.8)
LYMPHOCYTES NFR BLD: 33.9 % (ref 18–48)
MCH RBC QN AUTO: 29.9 PG (ref 27–31)
MCHC RBC AUTO-ENTMCNC: 31.6 G/DL (ref 32–36)
MCV RBC AUTO: 95 FL (ref 82–98)
MONOCYTES # BLD AUTO: 0.5 K/UL (ref 0.3–1)
MONOCYTES NFR BLD: 7.3 % (ref 4–15)
NEUTROPHILS # BLD AUTO: 3.7 K/UL (ref 1.8–7.7)
NEUTROPHILS NFR BLD: 55 % (ref 38–73)
NONHDLC SERPL-MCNC: 121 MG/DL
NRBC BLD-RTO: 0 /100 WBC
PLATELET # BLD AUTO: 250 K/UL (ref 150–350)
PMV BLD AUTO: 11.1 FL (ref 9.2–12.9)
POTASSIUM SERPL-SCNC: 5.1 MMOL/L (ref 3.5–5.1)
PROT SERPL-MCNC: 7 G/DL (ref 6–8.4)
RBC # BLD AUTO: 4.58 M/UL (ref 4.6–6.2)
SODIUM SERPL-SCNC: 142 MMOL/L (ref 136–145)
TRIGL SERPL-MCNC: 112 MG/DL (ref 30–150)
WBC # BLD AUTO: 6.73 K/UL (ref 3.9–12.7)

## 2019-11-05 PROCEDURE — 80053 COMPREHEN METABOLIC PANEL: CPT

## 2019-11-05 PROCEDURE — 99396 PREV VISIT EST AGE 40-64: CPT | Mod: S$GLB,,, | Performed by: FAMILY MEDICINE

## 2019-11-05 PROCEDURE — 80061 LIPID PANEL: CPT

## 2019-11-05 PROCEDURE — 99396 PR PREVENTIVE VISIT,EST,40-64: ICD-10-PCS | Mod: S$GLB,,, | Performed by: FAMILY MEDICINE

## 2019-11-05 PROCEDURE — 99999 PR PBB SHADOW E&M-EST. PATIENT-LVL III: ICD-10-PCS | Mod: PBBFAC,,, | Performed by: FAMILY MEDICINE

## 2019-11-05 PROCEDURE — 99999 PR PBB SHADOW E&M-EST. PATIENT-LVL III: CPT | Mod: PBBFAC,,, | Performed by: FAMILY MEDICINE

## 2019-11-05 PROCEDURE — 85025 COMPLETE CBC W/AUTO DIFF WBC: CPT

## 2019-11-05 PROCEDURE — 82306 VITAMIN D 25 HYDROXY: CPT

## 2019-11-05 NOTE — PROGRESS NOTES
"Subjective:       Patient ID: Juan Couch is a 49 y.o. male.    Chief Complaint: Annual Exam    49 year old male presents for an annual exam    He is tired all the time. He sleeps 7 hours. He occasionally sleeps through the night. He does snores.     Review of Systems   Respiratory: Negative for cough, chest tightness, shortness of breath and wheezing.    Cardiovascular: Negative for chest pain, palpitations and leg swelling.   Gastrointestinal: Negative for abdominal pain, blood in stool, diarrhea, nausea and vomiting.        Stools are watery for years.,    Genitourinary: Negative for difficulty urinating, dysuria and hematuria.   Neurological: Negative for dizziness, syncope, light-headedness and headaches.       Objective:       Vitals:    11/05/19 1032   BP: 112/76   Pulse: 61   Temp: 97.7 °F (36.5 °C)   TempSrc: Oral   SpO2: 97%   Weight: 95.7 kg (210 lb 15.7 oz)   Height: 5' 9" (1.753 m)       Physical Exam   Constitutional: He is oriented to person, place, and time. He appears well-developed and well-nourished. No distress.   HENT:   Head: Normocephalic and atraumatic.   Right Ear: External ear normal.   Left Ear: External ear normal.   Mouth/Throat: Oropharynx is clear and moist. No oropharyngeal exudate.   Eyes: Conjunctivae are normal.   Neck: Normal range of motion. Neck supple. No thyromegaly present.   Cardiovascular: Normal rate, regular rhythm and normal heart sounds. Exam reveals no gallop and no friction rub.   No murmur heard.  Pulmonary/Chest: Effort normal and breath sounds normal. No respiratory distress. He has no wheezes. He has no rales.   Abdominal: Soft. Bowel sounds are normal. He exhibits no distension and no mass. There is no tenderness. There is no rebound and no guarding.   Genitourinary: Rectal exam shows no external hemorrhoid, no internal hemorrhoid, no fissure and guaiac negative stool. Prostate is not enlarged and not tender. Right testis shows no mass. Left testis shows no mass. "   Musculoskeletal: He exhibits no edema.   Lymphadenopathy:     He has no cervical adenopathy.   Neurological: He is alert and oriented to person, place, and time.   Skin: He is not diaphoretic.   Psychiatric: He has a normal mood and affect.       Assessment:       1. Annual physical exam    2. Colon cancer screening    3. Other fatigue        Plan:       Juan was seen today for annual exam.    Diagnoses and all orders for this visit:    Annual physical exam  DUE FOR LABS. ADVISED TO QUIT SMOKING./     Colon cancer screening  -     Case request GI: COLONOSCOPY  colonscopy to evaluate for colon cancer screening. If normal-->  immodium for loose stools and if this doesn't control it--> VIBERZI    Other fatigue  -     Ambulatory referral to Sleep Disorders

## 2019-11-06 LAB — 25(OH)D3+25(OH)D2 SERPL-MCNC: 27 NG/ML (ref 30–96)

## 2019-11-15 ENCOUNTER — PATIENT MESSAGE (OUTPATIENT)
Dept: FAMILY MEDICINE | Facility: CLINIC | Age: 49
End: 2019-11-15

## 2019-11-15 DIAGNOSIS — E55.9 HYPOVITAMINOSIS D: ICD-10-CM

## 2019-11-15 RX ORDER — ERGOCALCIFEROL 1.25 MG/1
50000 CAPSULE ORAL
Qty: 12 CAPSULE | Refills: 0 | Status: SHIPPED | OUTPATIENT
Start: 2019-11-15 | End: 2020-08-31

## 2019-11-19 ENCOUNTER — TELEPHONE (OUTPATIENT)
Dept: SMOKING CESSATION | Facility: CLINIC | Age: 49
End: 2019-11-19

## 2019-12-03 DIAGNOSIS — Z12.11 COLON CANCER SCREENING: Primary | ICD-10-CM

## 2019-12-04 ENCOUNTER — CLINICAL SUPPORT (OUTPATIENT)
Dept: SMOKING CESSATION | Facility: CLINIC | Age: 49
End: 2019-12-04
Payer: COMMERCIAL

## 2019-12-04 DIAGNOSIS — F17.200 NICOTINE DEPENDENCE: Primary | ICD-10-CM

## 2019-12-04 PROCEDURE — 99407 BEHAV CHNG SMOKING > 10 MIN: CPT | Mod: S$GLB,,, | Performed by: INTERNAL MEDICINE

## 2019-12-04 PROCEDURE — 99407 PR TOBACCO USE CESSATION INTENSIVE >10 MINUTES: ICD-10-PCS | Mod: S$GLB,,, | Performed by: INTERNAL MEDICINE

## 2019-12-04 NOTE — PROGRESS NOTES
Spoke with patient today in regard to smoking cessation progress for 3/6 month telephone follow up, he states not tobacco free. Patient has scheduled an appointment to return to the program for quit attempt #3 on 1/10/2020 @ 10:30 am. Informed patient of benefit period, future follow ups, and contact information if any further help or support is needed. Will resolve episode and complete smart form for Quit attempt #2.

## 2020-01-07 ENCOUNTER — ANESTHESIA (OUTPATIENT)
Dept: ENDOSCOPY | Facility: HOSPITAL | Age: 50
End: 2020-01-07
Payer: COMMERCIAL

## 2020-01-07 ENCOUNTER — ANESTHESIA EVENT (OUTPATIENT)
Dept: ENDOSCOPY | Facility: HOSPITAL | Age: 50
End: 2020-01-07
Payer: COMMERCIAL

## 2020-01-07 ENCOUNTER — HOSPITAL ENCOUNTER (OUTPATIENT)
Facility: HOSPITAL | Age: 50
Discharge: HOME | End: 2020-01-07
Attending: INTERNAL MEDICINE | Admitting: INTERNAL MEDICINE
Payer: COMMERCIAL

## 2020-01-07 VITALS
RESPIRATION RATE: 18 BRPM | TEMPERATURE: 98 F | SYSTOLIC BLOOD PRESSURE: 118 MMHG | HEART RATE: 58 BPM | DIASTOLIC BLOOD PRESSURE: 61 MMHG | BODY MASS INDEX: 29.92 KG/M2 | WEIGHT: 202 LBS | OXYGEN SATURATION: 99 % | HEIGHT: 69 IN

## 2020-01-07 DIAGNOSIS — Z12.11 COLON CANCER SCREENING: ICD-10-CM

## 2020-01-07 PROCEDURE — 27201089 HC SNARE, DISP (ANY): Performed by: INTERNAL MEDICINE

## 2020-01-07 PROCEDURE — D9220A PRA ANESTHESIA: ICD-10-PCS | Mod: CRNA,,, | Performed by: NURSE ANESTHETIST, CERTIFIED REGISTERED

## 2020-01-07 PROCEDURE — 88305 TISSUE EXAM BY PATHOLOGIST: CPT | Mod: 26,,, | Performed by: PATHOLOGY

## 2020-01-07 PROCEDURE — 45385 COLONOSCOPY W/LESION REMOVAL: CPT | Mod: 33,,, | Performed by: INTERNAL MEDICINE

## 2020-01-07 PROCEDURE — D9220A PRA ANESTHESIA: Mod: ANES,,, | Performed by: ANESTHESIOLOGY

## 2020-01-07 PROCEDURE — 45385 PR COLONOSCOPY,REMV LESN,SNARE: ICD-10-PCS | Mod: 33,,, | Performed by: INTERNAL MEDICINE

## 2020-01-07 PROCEDURE — D9220A PRA ANESTHESIA: ICD-10-PCS | Mod: ANES,,, | Performed by: ANESTHESIOLOGY

## 2020-01-07 PROCEDURE — 63600175 PHARM REV CODE 636 W HCPCS: Performed by: NURSE ANESTHETIST, CERTIFIED REGISTERED

## 2020-01-07 PROCEDURE — 37000009 HC ANESTHESIA EA ADD 15 MINS: Performed by: INTERNAL MEDICINE

## 2020-01-07 PROCEDURE — D9220A PRA ANESTHESIA: Mod: CRNA,,, | Performed by: NURSE ANESTHETIST, CERTIFIED REGISTERED

## 2020-01-07 PROCEDURE — 45385 COLONOSCOPY W/LESION REMOVAL: CPT | Performed by: INTERNAL MEDICINE

## 2020-01-07 PROCEDURE — 88305 TISSUE EXAM BY PATHOLOGIST: CPT | Performed by: PATHOLOGY

## 2020-01-07 PROCEDURE — 88305 TISSUE EXAM BY PATHOLOGIST: ICD-10-PCS | Mod: 26,,, | Performed by: PATHOLOGY

## 2020-01-07 PROCEDURE — 37000008 HC ANESTHESIA 1ST 15 MINUTES: Performed by: INTERNAL MEDICINE

## 2020-01-07 RX ORDER — LIDOCAINE HCL/PF 100 MG/5ML
SYRINGE (ML) INTRAVENOUS
Status: DISCONTINUED | OUTPATIENT
Start: 2020-01-07 | End: 2020-01-07

## 2020-01-07 RX ORDER — PROPOFOL 10 MG/ML
VIAL (ML) INTRAVENOUS
Status: DISCONTINUED | OUTPATIENT
Start: 2020-01-07 | End: 2020-01-07

## 2020-01-07 RX ORDER — LIDOCAINE HYDROCHLORIDE 20 MG/ML
INJECTION, SOLUTION EPIDURAL; INFILTRATION; INTRACAUDAL; PERINEURAL
Status: DISCONTINUED
Start: 2020-01-07 | End: 2020-01-07 | Stop reason: HOSPADM

## 2020-01-07 RX ORDER — PROPOFOL 10 MG/ML
VIAL (ML) INTRAVENOUS
Status: COMPLETED
Start: 2020-01-07 | End: 2020-01-07

## 2020-01-07 RX ORDER — SODIUM CHLORIDE 9 MG/ML
INJECTION, SOLUTION INTRAVENOUS CONTINUOUS PRN
Status: DISCONTINUED | OUTPATIENT
Start: 2020-01-07 | End: 2020-01-07

## 2020-01-07 RX ADMIN — PROPOFOL 50 MG: 10 INJECTION, EMULSION INTRAVENOUS at 12:01

## 2020-01-07 RX ADMIN — PROPOFOL 50 MG: 10 INJECTION, EMULSION INTRAVENOUS at 01:01

## 2020-01-07 RX ADMIN — PROPOFOL 150 MG: 10 INJECTION, EMULSION INTRAVENOUS at 12:01

## 2020-01-07 RX ADMIN — SODIUM CHLORIDE: 0.9 INJECTION, SOLUTION INTRAVENOUS at 12:01

## 2020-01-07 RX ADMIN — LIDOCAINE HYDROCHLORIDE 100 MG: 20 INJECTION, SOLUTION INTRAVENOUS at 12:01

## 2020-01-07 NOTE — TRANSFER OF CARE
"Anesthesia Transfer of Care Note    Patient: Juan Couch    Procedure(s) Performed: Procedure(s) (LRB):  COLONOSCOPY (N/A)    Patient location: GI    Anesthesia Type: general (tiva)    Transport from OR: Transported from OR on 2-3 L/min O2 by NC with adequate spontaneous ventilation    Post pain: adequate analgesia    Post assessment: no apparent anesthetic complications    Post vital signs: stable    Level of consciousness: awake    Nausea/Vomiting: no nausea/vomiting    Complications: none    Transfer of care protocol was followed      Last vitals:   Visit Vitals  /63 (BP Location: Left arm, Patient Position: Lying)   Pulse 76   Temp 36.5 °C (97.7 °F) (Oral)   Resp 16   Ht 5' 9" (1.753 m)   Wt 91.6 kg (202 lb)   SpO2 98%   BMI 29.83 kg/m²     "

## 2020-01-07 NOTE — [employer] PATIENT INSTRUCTIONS
Discharge Summary/Instructions after an Endoscopic Procedure  Patient Name: Juan Couch  Patient MRN: [id number]  Patient YOB: 1970  Tuesday, January 07, 2020  Hiram Ashley MD  RESTRICTIONS:  During your procedure today, you received medications for sedation.  These   medications may affect your judgment, balance and coordination.  Therefore,   for 24 hours, you have the following restrictions:   - DO NOT drive a car, operate machinery, make legal/financial decisions,   sign important papers or drink alcohol.    ACTIVITY:  Today: no heavy lifting, straining or running due to procedural   sedation/anesthesia.  The following day: return to full activity including work.  DIET:  Eat and drink normally unless instructed otherwise.     TREATMENT FOR COMMON SIDE EFFECTS:  - Mild abdominal pain, nausea, belching, bloating or excessive gas:  rest,   eat lightly and use a heating pad.  - Sore Throat: treat with throat lozenges and/or gargle with warm salt   water.  - Because air was used during the procedure, expelling large amounts of air   from your rectum or belching is normal.  - If a bowel prep was taken, you may not have a bowel movement for 1-3 days.    This is normal.  SYMPTOMS TO WATCH FOR AND REPORT TO YOUR PHYSICIAN:  1. Abdominal pain or bloating, other than gas cramps.  2. Chest pain.  3. Back pain.  4. Signs of infection such as: chills or fever occurring within 24 hours   after the procedure.  5. Rectal bleeding, which would show as bright red, maroon, or black stools.   (A tablespoon of blood from the rectum is not serious, especially if   hemorrhoids are present.)  6. Vomiting.  7. Weakness or dizziness.  GO DIRECTLY TO THE NEAREST EMERGENCY ROOM IF YOU HAVE ANY OF THE FOLLOWING:      Difficulty breathing              Chills and/or fever over 101 F   Persistent vomiting and/or vomiting blood   Severe abdominal pain   Severe chest pain   Black, tarry stools   Bleeding- more than one  tablespoon   Any other symptom or condition that you feel may need urgent attention  Your doctor recommends these additional instructions:  If any biopsies were taken, your doctors clinic will contact you in 1 to 2   weeks with any results.  - Discharge patient to home (with escort).   - Resume previous diet indefinitely.   - Await pathology results.   - Repeat colonoscopy in 5 years for surveillance.   - Return to primary care physician as previously scheduled.  For questions, problems or results please call your physician - Hiram Ashley MD at Work:  [phone].  [facility] can be reached at [phone]     IF A COMPLICATION OR EMERGENCY SITUATION ARISES AND YOU ARE UNABLE TO REACH   YOUR PHYSICIAN - GO DIRECTLY TO THE EMERGENCY ROOM.  Hiram Ashley MD  1/7/2020 1:09:07 PM  [employer]

## 2020-01-07 NOTE — DISCHARGE INSTRUCTIONS
Diverticulosis    Diverticulosis means that small pouches have formed in the wall of your large intestine (colon). Most often, this problem causes no symptoms and is common as people age. But the pouches in the colon are at risk of becoming infected. When this happens, the condition is called diverticulitis. Although most people with diverticulosis never develop diverticulitis, it is still not uncommon. Rectal bleeding can also occur and in less common situations, a type of colon inflammation called colitis.  While most people do not have symptoms, some people with diverticulosis may have:  · Abdominal cramps and pain  · Bloating  · Constipation  · Change in bowel habits  Causes  The exact cause of diverticulosis (and diverticulitis) has not been proved, but a few things are associated with the condition:  · Low-fiber diet  · Constipation  · Lack of exercise  Your healthcare provider will talk with you about how to manage your condition. Diet changes may be all that are needed to help control diverticulosis and prevent progression to diverticulitis. If you develop diverticulitis, you will likely need other treatments.  Home care  You may be told to take fiber supplements daily. Fiber adds bulk to the stool so that it passes through the colon more easily. Stool softeners may be recommended. You may also be given medications for pain relief. Be sure to take all medications as directed.  In the past, people were told to avoid corn, nuts, and seeds. This is no longer necessary.  Follow these guidelines when caring for yourself at home:  · Eat unprocessed foods that are high in fiber. Whole grains, fruits, and vegetables are good choices.  · Drink 6 to 8 glasses of water every day unless your healthcare provider has you limit how much fluid you should have.  · Watch for changes in your bowel movements. Tell your provider if you notice any changes.  · Begin an exercise program. Ask your provider how to get started.  Generally, walking is the best.  · Get plenty of rest and sleep.  Follow-up care  Follow up with your healthcare provider, or as advised. Regular visits may be needed to check on your health. Sometimes special procedures such as colonoscopy, are needed after an episode of diverticulitis or blooding. Be sure to keep all your appointments.  If a stool sample was taken, or cultures were done, you should be told if they are positive, or if your treatment needs to be changed. You can call as directed for the results.  If X-rays were done, a radiologist will look at them. You will be told if there is a change in your treatment.  If antibiotics were prescribed, be sure to finish them all.  When to seek medical advice  Call your healthcare provider right away if any of these occur:  · Fever of 100.4°F (38°C) or higher, or as directed by your healthcare provider  · Severe cramps in the lower left side of the abdomen or pain that is getting worse  · Tenderness in the lower left side of the abdomen or worsening pain throughout the abdomen  · Diarrhea or constipation that doesn't get better within 24 hours  · Nausea and vomiting  · Bleeding from the rectum  Call 911  Call emergency services if any of the following occur:  · Trouble breathing  · Confusion  · Very drowsy or trouble awakening  · Fainting or loss of consciousness  · Rapid heart rate  · Chest pain  Date Last Reviewed: 12/30/2015 © 2000-2017 [email or web address]. [address] [place] [address]. All rights reserved. This information is not intended as a substitute for professional medical care. Always follow your healthcare professional's instructions.        Understanding Colon and Rectal Polyps    The colon (also called the large intestine) is a muscular tube that forms the last part of the digestive tract. It absorbs water and stores food waste. The colon is about 4 to 6 feet long. The rectum is the last 6 inches of the colon. The colon and rectum  have a smooth lining composed of millions of cells. Changes in these cells can lead to growths in the colon that can become cancerous and should be removed. Multiple tests are available to screen for colon cancer, but the colonoscopy is the most recommended test. During colonoscopy, these polyps can be removed. How often you need this test depends on many things including your condition, your family history, symptoms, and what the findings were at the previous colonoscopy.   When the colon lining changes  Changes that happen in the cells that line the colon or rectum can lead to growths called polyps. Over a period of years, polyps can turn cancerous. Removing polyps early may prevent cancer from ever forming.  Polyps  Polyps are fleshy clumps of tissue that form on the lining of the colon or rectum. Small polyps are usually benign (not cancerous). However, over time, cells in a polyp can change and become cancerous. Certain types of polyps known as adenomatous polyps are premalignant. The risk for invasive cancer increases with the size of the polyp and certain cell and gene features. This means that they can become cancerous if they're not removed. Hyperplastic polyps are benign. They can grow quite large and not turn cancerous.   Cancer  Almost all colorectal cancers start when polyp cells begin growing abnormally. As a cancerous tumor grows, it may involve more and more of the colon or rectum. In time, cancer can also grow beyond the colon or rectum and spread to nearby organs or to glands called lymph nodes. The cells can also travel to other parts of the body. This is known as metastasis. The earlier a cancerous tumor is removed, the better the chance of preventing its spread.    Date Last Reviewed: 8/1/2016  © 1744-6456 [employer]. [address] [place] [address], [place] [address]. All rights reserved. This information is not intended as a substitute for professional medical care. Always follow your  healthcare professional's instructions.

## 2020-01-07 NOTE — ANESTHESIA POSTPROCEDURE EVALUATION
Anesthesia Post Evaluation    Patient: Juan Couch    Procedure(s) Performed: Procedure(s) (LRB):  COLONOSCOPY (N/A)    Final Anesthesia Type: general    Patient location during evaluation: GI PACU  Patient participation: Yes- Able to Participate  Level of consciousness: awake and alert  Post-procedure vital signs: reviewed and stable  Pain management: adequate  Airway patency: patent    PONV status at discharge: No PONV  Anesthetic complications: no      Cardiovascular status: blood pressure returned to baseline  Respiratory status: unassisted and spontaneous ventilation  Hydration status: euvolemic  Follow-up not needed.          Vitals Value Taken Time   /61 1/7/2020  1:42 PM   Temp 36.5 °C (97.7 °F) 1/7/2020  1:12 PM   Pulse 58 1/7/2020  1:42 PM   Resp 18 1/7/2020  1:42 PM   SpO2 99 % 1/7/2020  1:42 PM         Event Time     Out of Recovery 13:50:25          Pain/Sarath Score: Sarath Score: 10 (1/7/2020  1:42 PM)

## 2020-01-07 NOTE — H&P
Pre-Procedure H&P:  Reason for Procedure: screen    HPI:  Pt is a 49 y.o. male screen    Past Medical History:   Diagnosis Date    Hyperparathyroidism        Past Surgical History:   Procedure Laterality Date    FRACTURE SURGERY      tibia, fibula, forarm       Family History   Problem Relation Age of Onset    Cancer Mother         uterine       Social History     Socioeconomic History    Marital status:      Spouse name: Not on file    Number of children: Not on file    Years of education: Not on file    Highest education level: Not on file   Occupational History     Comment: fire department   Social Needs    Financial resource strain: Not on file    Food insecurity:     Worry: Not on file     Inability: Not on file    Transportation needs:     Medical: Not on file     Non-medical: Not on file   Tobacco Use    Smoking status: Current Every Day Smoker     Packs/day: 0.75     Years: 22.00     Pack years: 16.50     Types: Cigarettes   Substance and Sexual Activity    Alcohol use: Yes     Alcohol/week: 0.8 standard drinks     Types: 1 Standard drinks or equivalent per week    Drug use: No    Sexual activity: Yes     Partners: Female     Birth control/protection: None   Lifestyle    Physical activity:     Days per week: Not on file     Minutes per session: Not on file    Stress: Not on file   Relationships    Social connections:     Talks on phone: Not on file     Gets together: Not on file     Attends Voodoo service: Not on file     Active member of club or organization: Not on file     Attends meetings of clubs or organizations: Not on file     Relationship status: Not on file   Other Topics Concern    Not on file   Social History Narrative    Works as a . He has one daughter.       Endoscopic History:      Review of patient's allergies indicates:  No Known Allergies    No current facility-administered medications on file prior to encounter.      Current Outpatient Medications  "on File Prior to Encounter   Medication Sig Dispense Refill    nicotine, polacrilex, (NICORETTE) 2 mg Gum 1-2 per hour in place of a cigarette. Limit to 20 a day - oral. Please dispense cinnamon flavor 170 each 0     No current facility-administered medications for this encounter.     ROS: Negative x 10    Patient Vitals for the past 24 hrs:   BP Temp Pulse Resp SpO2 Height Weight   01/07/20 1216 120/74 98.1 °F (36.7 °C) (!) 54 16 100 % 5' 9" (1.753 m) 91.6 kg (202 lb)       Gen: Well developed, well nourished, no apparent distress  HEENT: Anicteric, PERRLA  CV: S1, S2, no murmers/rubs, non-displaced PMI  Lungs: CTA-B, normal excursion  Abd: Soft, NT, ND, normal BS's, no HSM  Ext: No c/c/e, 1+ DP pulses to BLE's  Neuro: CN II-XII grossly intact, no asterixis.  Skin: No rashes/lesions.  Psych: AA&O x 4    Assessment:  Pt. Is a 49 y.o. male with:  1. screen    Recommendations:  1. Colonoscopy  2. F/U with PCP      I would like to take this opportunity to thank you for this consult.  If you have any questions or concerns, please do not hesitate to contact me.       "

## 2020-01-07 NOTE — ANESTHESIA PREPROCEDURE EVALUATION
01/07/2020  Juan Couch is a 49 y.o., male.  Pre-operative evaluation for Procedure(s) (LRB):  COLONOSCOPY (N/A)    NPO >8h food; 2h prep  METS 4-6    Patient Active Problem List   Diagnosis    Tibial fracture    Closed fracture of one rib of right side with routine healing       Review of patient's allergies indicates:  No Known Allergies    No current facility-administered medications on file prior to encounter.      Current Outpatient Medications on File Prior to Encounter   Medication Sig Dispense Refill    nicotine, polacrilex, (NICORETTE) 2 mg Gum 1-2 per hour in place of a cigarette. Limit to 20 a day - oral. Please dispense cinnamon flavor 170 each 0       Past Surgical History:   Procedure Laterality Date    FRACTURE SURGERY      tibia, fibula, forarm       Social History     Socioeconomic History    Marital status:      Spouse name: Not on file    Number of children: Not on file    Years of education: Not on file    Highest education level: Not on file   Occupational History     Comment: fire department   Social Needs    Financial resource strain: Not on file    Food insecurity:     Worry: Not on file     Inability: Not on file    Transportation needs:     Medical: Not on file     Non-medical: Not on file   Tobacco Use    Smoking status: Current Every Day Smoker     Packs/day: 0.75     Years: 22.00     Pack years: 16.50     Types: Cigarettes   Substance and Sexual Activity    Alcohol use: Yes     Alcohol/week: 0.8 standard drinks     Types: 1 Standard drinks or equivalent per week    Drug use: No    Sexual activity: Yes     Partners: Female     Birth control/protection: None   Lifestyle    Physical activity:     Days per week: Not on file     Minutes per session: Not on file    Stress: Not on file   Relationships    Social connections:     Talks on phone: Not on file     Gets  together: Not on file     Attends Anglican service: Not on file     Active member of club or organization: Not on file     Attends meetings of clubs or organizations: Not on file     Relationship status: Not on file   Other Topics Concern    Not on file   Social History Narrative    Works as a . He has one daughter.         Labs reviewed from 19 and wnl except Ca elevated at 11.3 (at baseline)      Diagnostic Studies:      EKD Echo:  No results found for this or any previous visit.      Anesthesia Evaluation    I have reviewed the Patient Summary Reports.     I have reviewed the Medications.     Review of Systems  Anesthesia Hx:  No problems with previous Anesthesia  History of prior surgery of interest to airway management or planning: Denies Family Hx of Anesthesia complications.   Denies Personal Hx of Anesthesia complications.   Social:  Smoker, Alcohol Use    Hematology/Oncology:  Hematology Normal   Oncology Normal     EENT/Dental:EENT/Dental Normal   Cardiovascular:  Cardiovascular Normal Exercise tolerance: good     Pulmonary:  Pulmonary Normal    Renal/:  Renal/ Normal     Hepatic/GI:   Bowel Prep.    Neurological:  Neurology Normal    Endocrine:  Endocrine Normal Elevated PTH; Ca 11.3       Physical Exam  General:  Obesity    Airway/Jaw/Neck:  Airway Findings: Mouth Opening: Normal Tongue: Normal  General Airway Assessment: Adult  Mallampati: III  TM Distance: Normal, at least 6 cm        Eyes/Ears/Nose:  EYES/EARS/NOSE FINDINGS: Normal   Dental:  Dental Findings: In tact        Mental Status:  Mental Status Findings: Normal        Anesthesia Plan  Type of Anesthesia, risks & benefits discussed:  Anesthesia Type:  general  Patient's Preference:   Intra-op Monitoring Plan: standard ASA monitors  Intra-op Monitoring Plan Comments:   Post Op Pain Control Plan: multimodal analgesia  Post Op Pain Control Plan Comments:   Induction:   IV  Beta Blocker:  Patient is not currently on  a Beta-Blocker (No further documentation required).       Informed Consent: Patient understands risks and agrees with Anesthesia plan.  Questions answered. Anesthesia consent signed with patient.  ASA Score: 2     Day of Surgery Review of History & Physical:  There are no significant changes.          Ready For Surgery From Anesthesia Perspective.

## 2020-01-08 ENCOUNTER — PATIENT MESSAGE (OUTPATIENT)
Dept: FAMILY MEDICINE | Facility: CLINIC | Age: 50
End: 2020-01-08

## 2020-01-09 LAB
FINAL PATHOLOGIC DIAGNOSIS: NORMAL
GROSS: NORMAL

## 2020-01-10 ENCOUNTER — CLINICAL SUPPORT (OUTPATIENT)
Dept: SMOKING CESSATION | Facility: CLINIC | Age: 50
End: 2020-01-10
Payer: COMMERCIAL

## 2020-01-10 DIAGNOSIS — F17.200 NICOTINE DEPENDENCE: Primary | ICD-10-CM

## 2020-01-10 PROCEDURE — 99999 PR PBB SHADOW E&M-EST. PATIENT-LVL I: ICD-10-PCS | Mod: PBBFAC,,,

## 2020-01-10 PROCEDURE — 99404 PR PREVENT COUNSEL,INDIV,60 MIN: ICD-10-PCS | Mod: S$GLB,,,

## 2020-01-10 PROCEDURE — 99999 PR PBB SHADOW E&M-EST. PATIENT-LVL I: CPT | Mod: PBBFAC,,,

## 2020-01-10 PROCEDURE — 99404 PREV MED CNSL INDIV APPRX 60: CPT | Mod: S$GLB,,,

## 2020-01-10 RX ORDER — VARENICLINE TARTRATE 0.5 (11)-1
KIT ORAL
Qty: 53 TABLET | Refills: 0 | Status: SHIPPED | OUTPATIENT
Start: 2020-01-10 | End: 2020-02-06

## 2020-01-10 NOTE — Clinical Note
Pt presents for intake smoking 1 pk per day, he states he did chantix a few years ago and was quit for 2 years before lapseing back. He tried the patches which left marks on his skin and did not like the oral NRT. He is here today because chantix is now offered through [facility] and is interested in getting back on trackwith the chantix and his quit, intake note: discussed strategies to help cut back and to help break the routine and habit associated with smoking, intake handout provided to the patient and discussed, all prescribed NRT discussed in depth as well as tobacco cessation

## 2020-01-22 ENCOUNTER — CLINICAL SUPPORT (OUTPATIENT)
Dept: SMOKING CESSATION | Facility: CLINIC | Age: 50
End: 2020-01-22
Payer: COMMERCIAL

## 2020-01-22 DIAGNOSIS — F17.200 NICOTINE DEPENDENCE: ICD-10-CM

## 2020-01-22 PROCEDURE — 99402 PREV MED CNSL INDIV APPRX 30: CPT | Mod: S$GLB,,,

## 2020-01-22 PROCEDURE — 99402 PR PREVENT COUNSEL,INDIV,30 MIN: ICD-10-PCS | Mod: S$GLB,,,

## 2020-01-22 PROCEDURE — 99999 PR PBB SHADOW E&M-EST. PATIENT-LVL I: CPT | Mod: PBBFAC,,,

## 2020-01-22 PROCEDURE — 99999 PR PBB SHADOW E&M-EST. PATIENT-LVL I: ICD-10-PCS | Mod: PBBFAC,,,

## 2020-01-22 NOTE — Clinical Note
Comments: pt presents for follow up smoking 10 cigs per day, he is down from a pk per day, he has no reports of negative s/e, he is currently on the the chantix starter pk day 11, he is taking as advsied after breakfast and after lunch, he is using the nicotine gum 2-3 times per day, recommend increasing the use of the nicotine gum to further cut back from the cigarettes, reminded him that chantix takes about 4-6 weeks to kick in to gear and to get the desired effects which were discussed. Reminded the patient to wait it out when he gets the thought and urge to smoke, this way he is getting in control of his smoking, session handout provided and discussed will follow

## 2020-01-22 NOTE — PROGRESS NOTES
Individual Follow-Up Form    1/22/2020    Quit Date: n/a    Clinical Status of Patient: Outpatient    Length of Service: 30 minutes    Continuing Medication: yes  Chantix or Nicotine gum    Other Medications: n/a     Target Symptoms: Withdrawal and medication side effects. The following were  rated moderate (3) to severe (4) on TCRS:  · Moderate (3): 00  · Severe (4): 0    Comments: pt presents for follow up smoking 10 cigs per day, he is down from a pk per day, he has no reports of negative s/e, he is currently on the the chantix starter pk day 11, he is taking as advsied after breakfast and after lunch, he is using the nicotine gum 2-3 times per day, recommend increasing the use of the nicotine gum to further cut back from the cigarettes, reminded him that chantix takes about 4-6 weeks to kick in to gear and to get the desired effects which were discussed. Reminded the patient to wait it out when he gets the thought and urge to smoke, this way he is getting in control of his smoking, session handout provided and discussed will follow     Diagnosis: F17.210    Next Visit: 2 weeks

## 2020-01-29 ENCOUNTER — PATIENT OUTREACH (OUTPATIENT)
Dept: ADMINISTRATIVE | Facility: OTHER | Age: 50
End: 2020-01-29

## 2020-02-06 ENCOUNTER — CLINICAL SUPPORT (OUTPATIENT)
Dept: SMOKING CESSATION | Facility: CLINIC | Age: 50
End: 2020-02-06
Payer: COMMERCIAL

## 2020-02-06 DIAGNOSIS — F17.200 NICOTINE DEPENDENCE: Primary | ICD-10-CM

## 2020-02-06 PROCEDURE — 99402 PR PREVENT COUNSEL,INDIV,30 MIN: ICD-10-PCS | Mod: S$GLB,,,

## 2020-02-06 PROCEDURE — 99999 PR PBB SHADOW E&M-EST. PATIENT-LVL I: CPT | Mod: PBBFAC,,,

## 2020-02-06 PROCEDURE — 99999 PR PBB SHADOW E&M-EST. PATIENT-LVL I: ICD-10-PCS | Mod: PBBFAC,,,

## 2020-02-06 PROCEDURE — 99402 PREV MED CNSL INDIV APPRX 30: CPT | Mod: S$GLB,,,

## 2020-02-06 RX ORDER — VARENICLINE TARTRATE 1 MG/1
1 TABLET, FILM COATED ORAL 2 TIMES DAILY
Qty: 58 TABLET | Refills: 0 | Status: SHIPPED | OUTPATIENT
Start: 2020-02-06 | End: 2020-03-06

## 2020-02-06 NOTE — PROGRESS NOTES
"Individual Follow-Up Form    2/6/2020    Quit Date: n/a    Clinical Status of Patient: Outpatient    Length of Service: 30 minutes    Continuing Medication: yes  Chantix    Other Medications: n/a     Target Symptoms: Withdrawal and medication side effects. The following were  rated moderate (3) to severe (4) on TCRS:  · Moderate (3): 0  · Severe (4): 0    Comments: pt presents for follow up smoking 5-10 cigarettes per day, he is currently finishing the chantix starter pk, per patient he is "not really" getting desired effects of chantix, discussed these with the patient, he is still early on and they may just be taking a little longer for him to get in full effect therefore recommend another month of chantix. Explained to him that there are some people that chantix does not work on and he may be one of them however a little to early to say, recommend he continue to take the chantix 1mg bid and continue to work on rate reduction. Session handout provided and discussed. Will follow     Diagnosis: F17.210    Next Visit: 2 weeks  "

## 2020-02-06 NOTE — Clinical Note
"Comments: pt presents for follow up smoking 5-10 cigarettes per day, he is currently finishing the chantix starter pk, per patient he is "not really" getting desired effects of chantix, discussed these with the patient, he is still early on and they may just be taking a little longer for him to get in full effect therefore recommend another month of chantix. Explained to him that there are some people that chantix does not work on and he may be one of them however a little to early to say, recommend he continue to take the chantix 1mg bid and continue to work on rate reduction. Session handout provided and discussed. Will follow "

## 2020-02-26 ENCOUNTER — PATIENT OUTREACH (OUTPATIENT)
Dept: ADMINISTRATIVE | Facility: OTHER | Age: 50
End: 2020-02-26

## 2020-03-02 ENCOUNTER — CLINICAL SUPPORT (OUTPATIENT)
Dept: SMOKING CESSATION | Facility: CLINIC | Age: 50
End: 2020-03-02
Payer: COMMERCIAL

## 2020-03-02 DIAGNOSIS — F17.200 NICOTINE DEPENDENCE: Primary | ICD-10-CM

## 2020-03-02 PROCEDURE — 99407 BEHAV CHNG SMOKING > 10 MIN: CPT | Mod: S$GLB,,,

## 2020-03-02 PROCEDURE — 99407 PR TOBACCO USE CESSATION INTENSIVE >10 MINUTES: ICD-10-PCS | Mod: S$GLB,,,

## 2020-03-02 NOTE — PROGRESS NOTES
Called patient in regards to cancelled appointment. Pt rescheduled for this Wednesday 3/4/2020. Patient reports not having a cigarette in 1 week. He remains on the prescribed tobacco cessation medication regimen of 1 mg chantix BID;  without any negative side effects at this time. He reports the cravings and desires for a cigarette are when he is drinking, whuch he hasn't in a week so he has been able to stay away from smoking. Advised patient to continue to be aware of his triggers and refrain from drinking and smoking at the same time, since that is where he struggles the most. Informed him the first 2-4 weeks are the hardest when quit, but with time it will get easier. Patient is at the end of 2nd month of Chantix, will send last Rx to pharmacy when he follows up later this week.

## 2020-04-08 ENCOUNTER — CLINICAL SUPPORT (OUTPATIENT)
Dept: SMOKING CESSATION | Facility: CLINIC | Age: 50
End: 2020-04-08
Payer: COMMERCIAL

## 2020-04-08 DIAGNOSIS — F17.200 NICOTINE DEPENDENCE: Primary | ICD-10-CM

## 2020-04-08 PROCEDURE — 99401 PR PREVENT COUNSEL,INDIV,15 MIN: ICD-10-PCS | Mod: S$GLB,,,

## 2020-04-08 PROCEDURE — 99401 PREV MED CNSL INDIV APPRX 15: CPT | Mod: S$GLB,,,

## 2020-04-08 RX ORDER — VARENICLINE TARTRATE 1 MG/1
1 TABLET, FILM COATED ORAL 2 TIMES DAILY
Qty: 60 TABLET | Refills: 0 | Status: SHIPPED | OUTPATIENT
Start: 2020-04-08 | End: 2020-05-08

## 2020-04-08 NOTE — PROGRESS NOTES
Individual Follow-Up Form    4/8/2020    Quit Date: 3/2/2020    Clinical Status of Patient: Outpatient    Length of Service: 15 minutes    Continuing Medication: yes  Chantix    Other Medications: none     Target Symptoms: Withdrawal and medication side effects. The following were  rated moderate (3) to severe (4) on TCRS:  · Moderate (3): none  · Severe (4): none    Comments: Called patient to follow up with smoking cessation progress. Patient stated he has been tobacco free since last time we spoke on 3/2/20, with only 1 slip and having 1 cigarette 4 days ago when he was drinking with his wife. His wife teran smoke, so he bummed one from her. He remains on the prescribed tobacco cessation medication regimen of 1 mg chantix BID;  without any negative side effects at this time. He reports the cravings and desires for a cigarette are mostly when he is drinking. Advised patient to continue to be aware of his triggers and refrain from drinking and smoking at the same time, since that is where he struggles the most. Patient is at the end of 2nd month of Chantix, will send last Rx to pharmacy. Pt is to follow up in 2 weeks.    Diagnosis: F17.200    Next Visit: 2 weeks

## 2020-04-08 NOTE — Clinical Note
Called patient to follow up with smoking cessation progress. Patient stated he has been tobacco free since last time we spoke on 3/2/20, with only 1 slip and having 1 cigarette 4 days ago when he was drinking with his wife. His wife teran smoke, so he bummed one from her. He remains on the prescribed tobacco cessation medication regimen of 1 mg chantix BID;  without any negative side effects at this time. He reports the cravings and desires for a cigarette are mostly when he is drinking. Advised patient to continue to be aware of his triggers and refrain from drinking and smoking at the same time, since that is where he struggles the most. Patient is at the end of 2nd month of Chantix, will send last Rx to pharmacy. Pt is to follow up in 2 weeks.

## 2020-04-13 ENCOUNTER — TELEPHONE (OUTPATIENT)
Dept: PULMONOLOGY | Facility: CLINIC | Age: 50
End: 2020-04-13

## 2020-04-13 NOTE — TELEPHONE ENCOUNTER
Spoke to pt about appointment scheduled for Friday pt wants to skip steps and doesn't want to drive to [facility] just for information but has to do a sleep study.

## 2020-04-13 NOTE — TELEPHONE ENCOUNTER
----- Message from Margoth Villalta sent at 4/11/2020 11:08 AM CDT -----  Pt asked for a call back regarding appt on 04/17/20. Pt asked will he be tested at that time or what.      Pt contact # [phone].      Thanks

## 2020-04-16 ENCOUNTER — TELEPHONE (OUTPATIENT)
Dept: PULMONOLOGY | Facility: CLINIC | Age: 50
End: 2020-04-16

## 2020-04-16 ENCOUNTER — PATIENT OUTREACH (OUTPATIENT)
Dept: ADMINISTRATIVE | Facility: OTHER | Age: 50
End: 2020-04-16

## 2020-04-17 ENCOUNTER — OFFICE VISIT (OUTPATIENT)
Dept: PULMONOLOGY | Facility: CLINIC | Age: 50
End: 2020-04-17
Payer: COMMERCIAL

## 2020-04-17 ENCOUNTER — TELEPHONE (OUTPATIENT)
Dept: PULMONOLOGY | Facility: HOSPITAL | Age: 50
End: 2020-04-17

## 2020-04-17 VITALS — BODY MASS INDEX: 30.36 KG/M2 | WEIGHT: 205 LBS | HEIGHT: 69 IN

## 2020-04-17 DIAGNOSIS — R06.83 SNORING: ICD-10-CM

## 2020-04-17 DIAGNOSIS — G47.26 SHIFTING SLEEP-WORK SCHEDULE: ICD-10-CM

## 2020-04-17 DIAGNOSIS — E66.9 OBESITY (BMI 30.0-34.9): ICD-10-CM

## 2020-04-17 DIAGNOSIS — R40.0 DAYTIME SLEEPINESS: ICD-10-CM

## 2020-04-17 DIAGNOSIS — G47.30 SLEEP-DISORDERED BREATHING: Primary | ICD-10-CM

## 2020-04-17 DIAGNOSIS — R53.83 FEELING TIRED: ICD-10-CM

## 2020-04-17 PROCEDURE — 3008F BODY MASS INDEX DOCD: CPT | Mod: CPTII,,, | Performed by: NURSE PRACTITIONER

## 2020-04-17 PROCEDURE — 99203 PR OFFICE/OUTPT VISIT, NEW, LEVL III, 30-44 MIN: ICD-10-PCS | Mod: 95,,, | Performed by: NURSE PRACTITIONER

## 2020-04-17 PROCEDURE — 99203 OFFICE O/P NEW LOW 30 MIN: CPT | Mod: 95,,, | Performed by: NURSE PRACTITIONER

## 2020-04-17 PROCEDURE — 3008F PR BODY MASS INDEX (BMI) DOCUMENTED: ICD-10-PCS | Mod: CPTII,,, | Performed by: NURSE PRACTITIONER

## 2020-04-17 NOTE — PATIENT INSTRUCTIONS
What Are Snoring and Obstructive Sleep Apnea?  If youve ever had a stuffed-up nose, you know the feeling of trying to breathe through a very narrow passageway. This is what happens in your throat when you snore. While you sleep, structures in your throat partially block your air passage, making the passage narrow and hard to breathe through. If the entire passage becomes blocked and you cant breathe at all, you have sleep apnea.      Snoring Obstructive sleep apnea   Snoring  If your throat structures are too large or the muscles relax too much during sleep, the air passage may be partially blocked. As air from the nose or mouth passes around this blockage, the throat structures vibrate, causing the familiar sound of snoring. At times, this sound can be so loud that snorers wake up others, or even themselves, during the night. Snoring gets worse as more and more of the air passage is blocked.  Obstructive sleep apnea  If the structures completely block the throat, air cant flow to the lungs at all. This is called apnea (meaning no breathing). Since the lungs arent getting fresh air, the brain tells the body to wake up just enough to tighten the muscles and unblock the air passage. With a loud gasp, breathing begins again. This process may be repeated over and over again throughout the night, making your sleep fragmented with a lighter stage of sleep. Even though you do not remember waking up many times during the night to a lighter sleep, you feel tired the next day. The lack of sleep and fresh air can also strain your lungs, heart, and other organs, leading to problems such as high blood pressure, heart attack, or stroke.  Problems in the nose and jaw  Problems in the structure of the nose may obstruct breathing. A crooked (deviated) septum or swollen turbinates can make snoring worse or lead to apnea. Also, a receding jaw may make the tongue sit too far back, so its more likely to block the airway when  youre asleep.        Date Last Reviewed: 7/18/2015  © 7125-2645 [employer]. [address] [place] [address]. All rights reserved. This information is not intended as a substitute for professional medical care. Always follow your healthcare professional's instructions.        Continuous Positive Air Pressure (CPAP)     A mask over the nose gently directs air into the throat to keep the airway open.   Continuous positive air pressure (CPAP) uses gentle air pressure to hold the airway open. CPAP is often the most effective treatment for sleep apnea and severe snoring. It works very well for many people. But keep in mind that it can take several adjustments before the setup is right for you.  How CPAP works  The CPAP machine  is a small portable pump beside the bed. The pump sends air through a hose, which is held over your nose and mouth by a mask. Mild air pressure is gently pushed through your airway. The air pressure nudges sagging tissues aside. This widens the airway so you can breathe better. CPAP may be combined with other kinds of therapy for sleep apnea.  Types of air pressure treatments  There are different types of CPAP. Your doctor or CPAP technician will help you decide which type is best for you:  · Basic CPAP keeps the pressure constant all night long.  · A bilevel device (BiPAP) provides more pressure when you breathe in and less when you breathe out. A BiPAP machine also may be set to provide automatic breaths to maintain breathing if you stop breathing while sleeping.  · An autoCPAP device automatically adjusts pressure throughout the night and in response to changes such as body position, sleep stage, and snoring.  Date Last Reviewed: 8/10/2015  © 7653-0193 [employer]. [address] [place], [place] [address]. All rights reserved. This information is not intended as a substitute for professional medical care. Always follow your healthcare professional's  instructions.

## 2020-04-17 NOTE — PROGRESS NOTES
Telemedicine video visit related to 2020 Covid -19 social distancing recommendations  The patient location is: Home  The chief complaint leading to consultation is: Sleep apnea  Visit type: audiovisual  Total time spent with patient: 12:09 -  12:39   Each patient to whom he or she provides medical services by telemedicine is:  (1) informed of the relationship between the physician and patient and the respective role of any other health care provider with respect to management of the patient; and (2) notified that he or she may decline to receive medical services by telemedicine and may withdraw from such care at any time.    Subjective:      Patient ID: Juan Couch is a 49 y.o. male.    Patient Active Problem List   Diagnosis    Tibial fracture    Closed fracture of one rib of right side with routine healing    Colon cancer screening     Chief Complaint: Sleep Apnea    HPI:  He engaged in telemedicine visit new patient sleep evaluation referred by his primary care physician Dr. Odessa Santana, he states [facility].   Patient has observed snoring, periods of not breathing witnessed by wife, feels tired and sleepy during the day, take naps during the day.  Patient reports non restful sleep.  He reports morning headache, occasionally.   He reports day time napping; duration 3 hours  He denies recent weight gain.  Cardiovascular risk factors: obesity  Bed time is 1000  Wake time is 0530  Sleep onset is within 30 -45  Minutes related to tinnitus   Sleep maintenance difficulties related to frequent night time awakening and non-restful sleep  Wake after sleep onset occurs three - four times a night.  Nocturia occurs two - three  times a night,   Sleep aids :  NO  Dry mouth : YES  Sleep walking:  NO  Sleep talking : YES  Sleep eating: NO  Vivid Dreams :  NO  Cataplexy :  NO    Barrow sleepiness score was 15.  Neck circumference is 40.5 cm. (16 inches).  Mallampati score 3    EPWORTH  SLEEPINESS SCALE 4/16/2020   Sitting and reading 3   Watching TV 3   Sitting, inactive in a public place (e.g. a theatre or a meeting) 3   As a passenger in a car for an hour without a break 0   Lying down to rest in the afternoon when circumstances permit 3   Sitting and talking to someone 0   Sitting quietly after a lunch without alcohol 3   In a car, while stopped for a few minutes in traffic 0   Total score 15       STOP - BANG Questionnaire:   1. Snoring : Do you snore loudly ?     YES  2. Tired : Do you often feel tired, fatigued, or sleepy during daytime?   YES  3. Observed: Has anyone observed you stop breathing during your sleep?     YES  4. Blood pressure : Do you have or are you being treated for high blood pressure?   NO  5. BMI :BMI more than 35 kg/m2?   NO  6. Age : Age over 50 yr old?   NO  7. Neck circumference: Neck circumference greater than 40 cm?    YES  8. Gender: Gender male?    YES    SCORE: 5    High risk of PUNEET: Yes 5 - 8  Intermediate risk of PUNEET: Yes 3 - 4  Low risk of UPNEET: Yes 0 - 2    Occupational History:   Employed full time as   with [employer] . Shifting work scheduled.     Previous Report Reviewed: lab reports and office notes     Past Medical History: The following portions of the patient's history were reviewed and updated as appropriate:   He  has a past surgical history that includes Fracture surgery and Colonoscopy (N/A, 1/7/2020).  His family history includes Cancer in his mother; No Known Problems in his father.  He  reports that he quit smoking about 6 weeks ago. His smoking use included cigarettes. He started smoking about 34 years ago. He has a 22.00 pack-year smoking history. He has never used smokeless tobacco. He reports that he drinks about 0.8 standard drinks of alcohol per week. He reports that he does not use drugs.  He has a current medication list which includes the following prescription(s): ergocalciferol, nicotine (polacrilex),  "and varenicline.  He has No Known Allergies..    Review of Systems   Constitutional: Negative.    HENT: Negative.    Eyes: Negative.    Respiratory: Positive for snoring.    Genitourinary: Negative for difficulty urinating.   Musculoskeletal: Negative for arthralgias and myalgias.   Gastrointestinal: Negative for nausea, abdominal pain and acid reflux.   Neurological: Negative for dizziness, light-headedness and headaches.   Hematological: Negative for adenopathy.   Psychiatric/Behavioral: The patient is not nervous/anxious.       Objective:   Ht 5' 9" (1.753 m)   Wt 93 kg (205 lb)   BMI 30.27 kg/m²   Physical Exam   Constitutional: He appears well-developed and well-nourished.   Pulmonary/Chest: Effort normal.   Psychiatric: He has a normal mood and affect. His behavior is normal. Judgment and thought content normal.     Personal Diagnostic Review  Review of labs, xray's, cardiology reports.     Assessment:     1. Sleep-disordered breathing    2. Snoring    3. Daytime sleepiness    4. Obesity (BMI 30.0-34.9)      Orders Placed This Encounter   Procedures    Home Sleep Studies     Standing Status:   Future     Standing Expiration Date:   4/17/2021     Scheduling Instructions:      2 night Home Sleep Study     Plan:   Discussed diagnosis, its evaluation, treatment and usual course. All questions answered.  Problem List Items Addressed This Visit     None      Visit Diagnoses     Sleep-disordered breathing    -  Primary    Relevant Orders    Home Sleep Studies    Snoring        Relevant Orders    Home Sleep Studies    Daytime sleepiness        Relevant Orders    Home Sleep Studies    Obesity (BMI 30.0-34.9)        Relevant Orders    Home Sleep Studies        TIME SPENT WITH PATIENT: Time spent:30 minutes in face to face  discussion concerning diagnosis, prognosis, review of lab and test results, benefits of treatment as well as management of disease, counseling of patient. Greater than half the time spent was used " for coordination of care and counseling of patient.     Plan Summary   ·  High risk for obstructive sleep apnea proceed with  2 nights home sleep testing.  ·  encouraged regular exercise and weight loss to normal weight  ·  so has shifting work/sleep schedule.  Able to sleep at times at work but can be awoken during the middle of the night as a .   Follow up for Home Sleep Study results review.    Thank you for the opportunity to participate in the care of this patient.

## 2020-04-17 NOTE — LETTER
April 17, 2020      Odessa Santana MD  [address]  [place] [address]           [facility]  [address] [place] [id number]  Phone: [phone]  Fax: [phone]          Patient: Juan Couch   MR Number: [id number]   YOB: 1970   Date of Visit: 4/17/2020       Dear Dr. Odessa Santana:    Thank you for referring Juan Couch to me for evaluation. Attached you will find relevant portions of my assessment and plan of care.    If you have questions, please do not hesitate to call me. I look forward to following Juan Couch along with you.    Sincerely,    Sondra Marcelo, ELTON    Enclosure  CC:  No Recipients    If you would like to receive this communication electronically, please contact [email or web address] or [phone] to request more information on [facility] access.    For providers and/or their staff who would like to refer a patient to [facility], please contact us through our one-stop-shop provider referral line, [facility] , at [phone].    If you feel you have received this communication in error or would no longer like to receive these types of communications, please e-mail [email or web address]

## 2020-04-22 ENCOUNTER — CLINICAL SUPPORT (OUTPATIENT)
Dept: SMOKING CESSATION | Facility: CLINIC | Age: 50
End: 2020-04-22
Payer: COMMERCIAL

## 2020-04-22 DIAGNOSIS — F17.200 NICOTINE DEPENDENCE: Primary | ICD-10-CM

## 2020-04-22 PROCEDURE — 99999 PR PBB SHADOW E&M-EST. PATIENT-LVL I: ICD-10-PCS | Mod: PBBFAC,,,

## 2020-04-22 PROCEDURE — 99401 PR PREVENT COUNSEL,INDIV,15 MIN: ICD-10-PCS | Mod: S$GLB,,,

## 2020-04-22 PROCEDURE — 99401 PREV MED CNSL INDIV APPRX 15: CPT | Mod: S$GLB,,,

## 2020-04-22 PROCEDURE — 99999 PR PBB SHADOW E&M-EST. PATIENT-LVL I: CPT | Mod: PBBFAC,,,

## 2020-04-22 NOTE — Clinical Note
Called patient to follow up with smoking cessation progress. Patient stated he remains tobacco free since 3/2/20, with no slips. He remains on the prescribed tobacco cessation medication regimen of 1 mg chantix BID;  without any negative side effects at this time. Patient is on the 3rd month of Chantix, he is to complete 3 month regimen. Pt is to follow up in 2 weeks.

## 2020-04-22 NOTE — PROGRESS NOTES
Individual Follow-Up Form    4/22/2020    Quit Date: 3/2/2020    Clinical Status of Patient: Outpatient    Length of Service: 15 minutes    Continuing Medication: yes  Chantix    Other Medications: none     Target Symptoms: Withdrawal and medication side effects. The following were  rated moderate (3) to severe (4) on TCRS:  · Moderate (3): none  · Severe (4): none    Comments: Called patient to follow up with smoking cessation progress. Patient stated he remains tobacco free since 3/2/20, with no slips. He remains on the prescribed tobacco cessation medication regimen of 1 mg chantix BID;  without any negative side effects at this time. Patient is on the 3rd month of Chantix, he is to complete 3 month regimen. Pt is to follow up in 2 weeks.    Diagnosis: F17.200    Next Visit: 2 weeks

## 2020-04-28 ENCOUNTER — PROCEDURE VISIT (OUTPATIENT)
Dept: SLEEP MEDICINE | Facility: CLINIC | Age: 50
End: 2020-04-28
Payer: COMMERCIAL

## 2020-04-28 DIAGNOSIS — G47.33 OSA (OBSTRUCTIVE SLEEP APNEA): Primary | ICD-10-CM

## 2020-04-28 DIAGNOSIS — G47.26 SHIFTING SLEEP-WORK SCHEDULE: ICD-10-CM

## 2020-04-28 DIAGNOSIS — E66.9 OBESITY (BMI 30.0-34.9): ICD-10-CM

## 2020-04-28 DIAGNOSIS — R53.83 FEELING TIRED: ICD-10-CM

## 2020-04-28 DIAGNOSIS — G47.30 SLEEP-DISORDERED BREATHING: ICD-10-CM

## 2020-04-28 DIAGNOSIS — R06.83 SNORING: ICD-10-CM

## 2020-04-28 DIAGNOSIS — R40.0 DAYTIME SLEEPINESS: ICD-10-CM

## 2020-04-28 PROCEDURE — 99499 NO LOS: ICD-10-PCS | Mod: S$GLB,,, | Performed by: INTERNAL MEDICINE

## 2020-04-28 PROCEDURE — 99499 UNLISTED E&M SERVICE: CPT | Mod: S$GLB,,, | Performed by: INTERNAL MEDICINE

## 2020-04-28 NOTE — Clinical Note
Based on the American academy Sleep Medicine practice parameter of CPAP would be the guidelinerecommendation of choice.Other therapies may include ENT procedures were appropriate, significant weight loss.Inspire hypoglossal nerve stimulator and nasal PROVENT or mandibular advancement device may also beconsidered.Close follow up to ensure resolution of symptoms.2 night studyMILD OBSTRUCTIVE SLEEP APNEA with overall AHI 10.2/hr ( 63 events): night #1Oxygen desaturation: 78%. SpO2 between 70% to 79% for < 1 min.Patient snored 100% time above 50 .Heart rate range: 61 bpm - 135 bpmREC's:Therapy with APAP at 4-20 cm WP using mask of choice with heated humidification is an option.Weight loss/management. with regular exercise per direction of physician.Avoid drowsy driving.Follow up in sleep clinic to maximize adherence and ensure resolution of symptoms

## 2020-04-30 ENCOUNTER — TELEPHONE (OUTPATIENT)
Dept: PULMONOLOGY | Facility: CLINIC | Age: 50
End: 2020-04-30

## 2020-04-30 NOTE — TELEPHONE ENCOUNTER
----- Message from Omid Solano sent at 4/30/2020 11:04 AM CDT -----  Contact: Self  Type:  Test Results    Who Called: Self    Name of Test (Lab/Mammo/Etc):  Home Sleep Study  Date of Test: 04-  Ordering Provider: Davian  Where the test was performed: Home    Would the patient rather a call back or a response via [facility]? Call     Best Call Back Number: [phone]        For Clinical Team:Has the provider reviewed the results?

## 2020-04-30 NOTE — TELEPHONE ENCOUNTER
Patient called into the office about sleep study result . Explain to patient result not completed yet will contact as soon as office receive report.

## 2020-05-06 ENCOUNTER — TELEPHONE (OUTPATIENT)
Dept: PULMONOLOGY | Facility: CLINIC | Age: 50
End: 2020-05-06

## 2020-05-06 ENCOUNTER — CLINICAL SUPPORT (OUTPATIENT)
Dept: SMOKING CESSATION | Facility: CLINIC | Age: 50
End: 2020-05-06
Payer: COMMERCIAL

## 2020-05-06 DIAGNOSIS — F17.200 NICOTINE DEPENDENCE: ICD-10-CM

## 2020-05-06 PROCEDURE — 99402 PREV MED CNSL INDIV APPRX 30: CPT | Mod: S$GLB,,,

## 2020-05-06 PROCEDURE — 99999 PR PBB SHADOW E&M-EST. PATIENT-LVL I: ICD-10-PCS | Mod: PBBFAC,,,

## 2020-05-06 PROCEDURE — 99999 PR PBB SHADOW E&M-EST. PATIENT-LVL I: CPT | Mod: PBBFAC,,,

## 2020-05-06 PROCEDURE — 99402 PR PREVENT COUNSEL,INDIV,30 MIN: ICD-10-PCS | Mod: S$GLB,,,

## 2020-05-06 NOTE — PROGRESS NOTES
Individual Follow-Up Form    5/6/2020    Quit Date: ??    Clinical Status of Patient: Outpatient    Length of Service: 30 minutes    Continuing Medication: yes  Chantix or Nicotine gum    Other Medications: n/a     Target Symptoms: Withdrawal and medication side effects. The following were  rated moderate (3) to severe (4) on TCRS:  · Moderate (3): 0  · Severe (4): 0    Comments: pt was contacted via telephone for follow up and check on progress, he is currently on chantix and the nicotine gum 2mg, he does not need refills at this time, he is not have strong thoughts and urges to smoke, although more idle now with the current situation he is remaining strong and has not had any slips, recommend he finish the chantix and contact CTTS if he should need anything regarding smoking cessation, session handout discussed and strategies discussed. Will follow     Diagnosis: F17.210    Next Visit: 2 weeks

## 2020-05-06 NOTE — TELEPHONE ENCOUNTER
Patient called about sleep study result not in yet . Explain to patient will call when result are in.

## 2020-05-06 NOTE — Clinical Note
Comments: pt was contacted via telephone for follow up and check on progress, he is currently on chantix and the nicotine gum 2mg, he does not need refills at this time, he is not have strong thoughts and urges to smoke, although more idle now with the current situation he is remaining strong and has not had any slips, recommend he finish the chantix and contact CTTS if he should need anything regarding smoking cessation, session handout discussed and strategies discussed. Will follow

## 2020-05-06 NOTE — TELEPHONE ENCOUNTER
----- Message from Imani Melgar sent at 5/6/2020  8:34 AM CDT -----  Contact: pt  Type:  Patient Returning Call    Who Called:  Pt  Does the patient know what this is regarding?:  Pt would like a call back from the office.  Best Call Back Number:    Additional Information:  Please follow up, thank you

## 2020-05-07 ENCOUNTER — PATIENT MESSAGE (OUTPATIENT)
Dept: PULMONOLOGY | Facility: CLINIC | Age: 50
End: 2020-05-07

## 2020-05-07 ENCOUNTER — TELEPHONE (OUTPATIENT)
Dept: PULMONOLOGY | Facility: CLINIC | Age: 50
End: 2020-05-07

## 2020-05-07 PROCEDURE — 95806 PR SLEEP STUDY, UNATTENDED, SIMUL RECORD HR/O2 SAT/RESP FLOW/RESP EFFT: ICD-10-PCS | Mod: 26,S$GLB,, | Performed by: INTERNAL MEDICINE

## 2020-05-07 PROCEDURE — 95806 SLEEP STUDY UNATT&RESP EFFT: CPT | Mod: 26,S$GLB,, | Performed by: INTERNAL MEDICINE

## 2020-05-07 NOTE — PROCEDURES
Home Sleep Studies  Date/Time: 4/28/2020 8:00 AM  Performed by: Franky Lew MD  Authorized by: Sondra Marcelo NP       Based on the American academy Sleep Medicine practice parameter of CPAP would be the guideline  recommendation of choice.  Other therapies may include ENT procedures were appropriate, significant weight loss.  Inspire hypoglossal nerve stimulator and nasal PROVENT or mandibular advancement device may also be  considered.  Close follow up to ensure resolution of symptoms.  2 night study  MILD OBSTRUCTIVE SLEEP APNEA with overall AHI 10.2/hr ( 63 events): night #1  Oxygen desaturation: 78%. SpO2 between 70% to 79% for < 1 min.  Patient snored 100% time above 50 .  Heart rate range: 61 bpm - 135 bpm  REC's:  Therapy with APAP at 4-20 cm WP using mask of choice with heated humidification is an option.  Weight loss/management. with regular exercise per direction of physician.  Avoid drowsy driving.  Follow up in sleep clinic to maximize adherence and ensure resolution of symptoms

## 2020-05-07 NOTE — TELEPHONE ENCOUNTER
2 night study 04/26/2020, 04/27/2020  MILD OBSTRUCTIVE SLEEP APNEA with overall AHI 10.2/hr ( 63 events): night #1  Oxygen desaturation: 78%. SpO2 between 70% to 79% for < 1 min.  Patient snored 100% time above 50 .  Heart rate range: 61 bpm - 135 bpm  REC's:  Therapy with APAP at 4-20 cm WP using mask of choice with heated humidification is an option.  Weight loss/management. with regular exercise per direction of physician.  Avoid drowsy driving.  Follow up in sleep clinic to maximize adherence and ensure resolution of symptoms

## 2020-05-18 ENCOUNTER — PATIENT OUTREACH (OUTPATIENT)
Dept: ADMINISTRATIVE | Facility: OTHER | Age: 50
End: 2020-05-18

## 2020-05-19 ENCOUNTER — CLINICAL SUPPORT (OUTPATIENT)
Dept: SMOKING CESSATION | Facility: CLINIC | Age: 50
End: 2020-05-19
Payer: COMMERCIAL

## 2020-05-19 DIAGNOSIS — F17.200 NICOTINE DEPENDENCE: Primary | ICD-10-CM

## 2020-05-19 PROCEDURE — 99407 BEHAV CHNG SMOKING > 10 MIN: CPT | Mod: S$GLB,,,

## 2020-05-19 PROCEDURE — 99407 PR TOBACCO USE CESSATION INTENSIVE >10 MINUTES: ICD-10-PCS | Mod: S$GLB,,,

## 2020-05-19 NOTE — PROGRESS NOTES
Spoke with patient today in regard to smoking cessation progress for 12-month telephone follow up, he states tobacco free. Congratulated patient on his accomplishment. Informed patient of benefit period and contact information if any further help or support is needed. Will complete / resolve smart form for 12 month follow up on Quit attempt #2.

## 2020-05-20 ENCOUNTER — TELEPHONE (OUTPATIENT)
Dept: PULMONOLOGY | Facility: CLINIC | Age: 50
End: 2020-05-20

## 2020-05-20 ENCOUNTER — OFFICE VISIT (OUTPATIENT)
Dept: PULMONOLOGY | Facility: CLINIC | Age: 50
End: 2020-05-20
Payer: COMMERCIAL

## 2020-05-20 VITALS — HEIGHT: 69 IN | WEIGHT: 211 LBS | BODY MASS INDEX: 31.25 KG/M2

## 2020-05-20 DIAGNOSIS — G47.33 OBSTRUCTIVE SLEEP APNEA: Primary | ICD-10-CM

## 2020-05-20 DIAGNOSIS — E66.9 OBESITY (BMI 30.0-34.9): ICD-10-CM

## 2020-05-20 PROBLEM — E66.811 OBESITY (BMI 30.0-34.9): Status: ACTIVE | Noted: 2020-05-20

## 2020-05-20 PROCEDURE — 99213 OFFICE O/P EST LOW 20 MIN: CPT | Mod: 95,,, | Performed by: NURSE PRACTITIONER

## 2020-05-20 PROCEDURE — 3008F PR BODY MASS INDEX (BMI) DOCUMENTED: ICD-10-PCS | Mod: CPTII,,, | Performed by: NURSE PRACTITIONER

## 2020-05-20 PROCEDURE — 99213 PR OFFICE/OUTPT VISIT, EST, LEVL III, 20-29 MIN: ICD-10-PCS | Mod: 95,,, | Performed by: NURSE PRACTITIONER

## 2020-05-20 PROCEDURE — 3008F BODY MASS INDEX DOCD: CPT | Mod: CPTII,,, | Performed by: NURSE PRACTITIONER

## 2020-05-20 NOTE — PROGRESS NOTES
The patient location is: home  The chief complaint leading to consultation is: sleep apnea  Visit type: Virtual visit with synchronous audio and video  Face to Face time with patient: 1225 - 12:45  20 minutes of total time spent on the encounter, which includes face to face time and non-face to face time preparing to see the patient (eg, review of tests), Obtaining and/or reviewing separately obtained history, Documenting clinical information in the electronic or other health record, Independently interpreting results (not separately reported) and communicating results to the patient/family/caregiver, or Care coordination (not separately reported).     Each patient to whom he or she provides medical services by telemedicine is:  (1) informed of the relationship between the physician and patient and the respective role of any other health care provider with respect to management of the patient; and (2) notified that he or she may decline to receive medical services by telemedicine and may withdraw from such care at any time.    Notes: review Home Sleep Study sleep    Subjective:      Patient ID: Juan Couch is a 49 y.o. male.    Chief Complaint: Sleep Apnea    HPI: Juan Couch engaged in telemedicine visit follow up for [facility] 4/26/2020 and 4/27/2020 Home Sleep Study 2 night study MILD OBSTRUCTIVE SLEEP APNEA with overall AHI 10.2/hr ( 63 events): night #1. Oxygen desaturation: 78%. SpO2 between 70% to 79% for < 1 min. Patient snored 100% time above 50 . Heart rate range: 61 bpm - 135 bpm  Orders this visit to begin auto CPAP 5-20 cm with patient preference of nasal mask  [place] 9    Previous Report Reviewed: lab reports and office notes     Past Medical History: The following portions of the patient's history were reviewed and updated as appropriate:   He  has a past surgical history that includes Fracture surgery and Colonoscopy (N/A, 1/7/2020).  His family history includes Cancer in his mother; No Known Problems in  "his father.  He  reports that he quit smoking about 2 months ago. His smoking use included cigarettes. He started smoking about 34 years ago. He has a 22.00 pack-year smoking history. He has never used smokeless tobacco. He reports that he drinks about 0.8 standard drinks of alcohol per week. He reports that he does not use drugs.  He has a current medication list which includes the following prescription(s): ergocalciferol and nicotine (polacrilex).  He has No Known Allergies.    The following portions of the patient's history were reviewed and updated as appropriate: allergies, current medications, past family history, past medical history, past social history, past surgical history and problem list.    Review of Systems   Respiratory: Positive for apnea and snoring.       Objective:   Ht 5' 9" (1.753 m)   Wt 95.7 kg (211 lb)   BMI 31.16 kg/m²   Physical Exam   Constitutional: He appears well-developed and well-nourished. He is active and cooperative.  Non-toxic appearance. He does not have a sickly appearance. He does not appear ill. No distress.   Pulmonary/Chest: Effort normal.   Psychiatric: He has a normal mood and affect. His behavior is normal. Judgment and thought content normal.     Personal Diagnostic Review  4/26/2020 and 4/27/2020 Home Sleep Study    2 night study  MILD OBSTRUCTIVE SLEEP APNEA with overall AHI 10.2/hr ( 63 events): night #1  Oxygen desaturation: 78%. SpO2 between 70% to 79% for < 1 min.  Patient snored 100% time above 50 .  Heart rate range: 61 bpm - 135 bpm  REC's:  Therapy with APAP at 4-20 cm WP using mask of choice with heated humidification is an option.    Assessment:     1. Obstructive sleep apnea    2. Obesity (BMI 30.0-34.9)        Orders Placed This Encounter   Procedures    CPAP FOR HOME USE     4/26/2020, 4/27/2020 2 night study MILD OBSTRUCTIVE SLEEP APNEA with overall AHI 10.2/hr ( 63 events): night #1  Oxygen desaturation: 78%. SpO2 between 70% to 79% for < 1 min. "     Order Specific Question:   Type:     Answer:   Auto CPAP     Order Specific Question:   Auto CPAP pressure setting range (cmH20):     Answer:   5-20 cm     Order Specific Question:   Length of need (1-99 months):     Answer:   99     Order Specific Question:   Humidification:     Answer:   Heated     Order Specific Question:   Type of mask:     Answer:   Nasal     Order Specific Question:   Headgear?     Answer:   Yes     Order Specific Question:   Tubing?     Answer:   Yes     Order Specific Question:   Humidifier chamber?     Answer:   Yes     Order Specific Question:   Chin strap?     Answer:   Yes     Order Specific Question:   Filters?     Answer:   Yes     Order Specific Question:   Cushions?     Answer:   Yes     Plan:     Problem List Items Addressed This Visit     Obstructive sleep apnea - Primary     4/26/2020 and 4/27/2020 Home Sleep Study 2 night study MILD OBSTRUCTIVE SLEEP APNEA with overall AHI 10.2/hr ( 63 events): night #1. Oxygen desaturation: 78%. SpO2 between 70% to 79% for < 1 min. Patient snored 100% time above 50 . Heart rate range: 61 bpm - 135 bpm  Orders this visit to begin auto CPAP 5-20 cm with patient preference of nasal mask           Relevant Orders    CPAP FOR HOME USE    Obesity (BMI 30.0-34.9)     Encouraged calorie reduction and 30 minutes of exercise daily. Discussed impact of obesity on general health.                  (DME) - [facility]  Reviewed therapeutic goals for positive airway pressure therapy Auto CPAP  Ideal is usage 100% of nights for 6 - 8 hours per night. Minimum usage is 70% of night for at least 4 hours per night used.     Follow up in about 12 weeks (around 8/12/2020) for CPAP compliance download after initial set up.

## 2020-05-20 NOTE — ASSESSMENT & PLAN NOTE
4/26/2020 and 4/27/2020 Home Sleep Study 2 night study MILD OBSTRUCTIVE SLEEP APNEA with overall AHI 10.2/hr ( 63 events): night #1. Oxygen desaturation: 78%. SpO2 between 70% to 79% for < 1 min. Patient snored 100% time above 50 . Heart rate range: 61 bpm - 135 bpm  Orders this visit to begin auto CPAP 5-20 cm with patient preference of nasal mask

## 2020-05-20 NOTE — PATIENT INSTRUCTIONS
What Are Snoring and Obstructive Sleep Apnea?  If youve ever had a stuffed-up nose, you know the feeling of trying to breathe through a very narrow passageway. This is what happens in your throat when you snore. While you sleep, structures in your throat partially block your air passage, making the passage narrow and hard to breathe through. If the entire passage becomes blocked and you cant breathe at all, you have sleep apnea.      Snoring Obstructive sleep apnea   Snoring  If your throat structures are too large or the muscles relax too much during sleep, the air passage may be partially blocked. As air from the nose or mouth passes around this blockage, the throat structures vibrate, causing the familiar sound of snoring. At times, this sound can be so loud that snorers wake up others, or even themselves, during the night. Snoring gets worse as more and more of the air passage is blocked.  Obstructive sleep apnea  If the structures completely block the throat, air cant flow to the lungs at all. This is called apnea (meaning no breathing). Since the lungs arent getting fresh air, the brain tells the body to wake up just enough to tighten the muscles and unblock the air passage. With a loud gasp, breathing begins again. This process may be repeated over and over again throughout the night, making your sleep fragmented with a lighter stage of sleep. Even though you do not remember waking up many times during the night to a lighter sleep, you feel tired the next day. The lack of sleep and fresh air can also strain your lungs, heart, and other organs, leading to problems such as high blood pressure, heart attack, or stroke.  Problems in the nose and jaw  Problems in the structure of the nose may obstruct breathing. A crooked (deviated) septum or swollen turbinates can make snoring worse or lead to apnea. Also, a receding jaw may make the tongue sit too far back, so its more likely to block the airway when  youre asleep.        Date Last Reviewed: 7/18/2015  © 3434-7509 [employer]. [address] [place] [address]. All rights reserved. This information is not intended as a substitute for professional medical care. Always follow your healthcare professional's instructions.        Continuous Positive Air Pressure (CPAP)     A mask over the nose gently directs air into the throat to keep the airway open.   Continuous positive air pressure (CPAP) uses gentle air pressure to hold the airway open. CPAP is often the most effective treatment for sleep apnea and severe snoring. It works very well for many people. But keep in mind that it can take several adjustments before the setup is right for you.  How CPAP works  The CPAP machine  is a small portable pump beside the bed. The pump sends air through a hose, which is held over your nose and mouth by a mask. Mild air pressure is gently pushed through your airway. The air pressure nudges sagging tissues aside. This widens the airway so you can breathe better. CPAP may be combined with other kinds of therapy for sleep apnea.  Types of air pressure treatments  There are different types of CPAP. Your doctor or CPAP technician will help you decide which type is best for you:  · Basic CPAP keeps the pressure constant all night long.  · A bilevel device (BiPAP) provides more pressure when you breathe in and less when you breathe out. A BiPAP machine also may be set to provide automatic breaths to maintain breathing if you stop breathing while sleeping.  · An autoCPAP device automatically adjusts pressure throughout the night and in response to changes such as body position, sleep stage, and snoring.  Date Last Reviewed: 8/10/2015  © 3165-7144 [employer]. [address] [place], [place] [id number]. All rights reserved. This information is not intended as a substitute for professional medical care. Always follow your healthcare professional's  instructions.

## 2020-07-15 ENCOUNTER — CLINICAL SUPPORT (OUTPATIENT)
Dept: SMOKING CESSATION | Facility: CLINIC | Age: 50
End: 2020-07-15
Payer: COMMERCIAL

## 2020-07-15 DIAGNOSIS — F17.200 NICOTINE DEPENDENCE: Primary | ICD-10-CM

## 2020-07-15 PROCEDURE — 99407 PR TOBACCO USE CESSATION INTENSIVE >10 MINUTES: ICD-10-PCS | Mod: S$GLB,,, | Performed by: INTERNAL MEDICINE

## 2020-07-15 PROCEDURE — 99407 BEHAV CHNG SMOKING > 10 MIN: CPT | Mod: S$GLB,,, | Performed by: INTERNAL MEDICINE

## 2020-07-24 ENCOUNTER — CLINICAL SUPPORT (OUTPATIENT)
Dept: SMOKING CESSATION | Facility: CLINIC | Age: 50
End: 2020-07-24
Payer: COMMERCIAL

## 2020-07-24 DIAGNOSIS — F17.200 NICOTINE DEPENDENCE: ICD-10-CM

## 2020-07-24 PROCEDURE — 99404 PREV MED CNSL INDIV APPRX 60: CPT | Mod: S$GLB,,,

## 2020-07-24 PROCEDURE — 99999 PR PBB SHADOW E&M-EST. PATIENT-LVL I: CPT | Mod: PBBFAC,,,

## 2020-07-24 PROCEDURE — 99999 PR PBB SHADOW E&M-EST. PATIENT-LVL I: ICD-10-PCS | Mod: PBBFAC,,,

## 2020-07-24 PROCEDURE — 99404 PR PREVENT COUNSEL,INDIV,60 MIN: ICD-10-PCS | Mod: S$GLB,,,

## 2020-07-24 RX ORDER — MICONAZOLE NITRATE 2 %
CREAM (GRAM) TOPICAL
Qty: 100 EACH | Refills: 0 | Status: SHIPPED | OUTPATIENT
Start: 2020-07-24 | End: 2020-08-24

## 2020-07-24 RX ORDER — VARENICLINE TARTRATE 0.5 (11)-1
KIT ORAL
Qty: 53 TABLET | Refills: 0 | Status: SHIPPED | OUTPATIENT
Start: 2020-07-24 | End: 2020-08-21

## 2020-07-24 NOTE — Clinical Note
Pt presents for intake smoking 1/2 pk per day, after assessment and discussion recommend the chantix starter pk in conjunction with the 2mg nicotine gum for strong thoughts and urges and use during rate reduction while starting chantix, discussed strategies to help cut back and to help break the routine and habit associated with smoking, intake handout provided to the patient and discussed, all prescribed NRT discussed in depth as well as tobacco cessation

## 2020-08-14 DIAGNOSIS — Z11.59 NEED FOR HEPATITIS C SCREENING TEST: ICD-10-CM

## 2020-08-31 ENCOUNTER — OFFICE VISIT (OUTPATIENT)
Dept: FAMILY MEDICINE | Facility: CLINIC | Age: 50
End: 2020-08-31
Payer: COMMERCIAL

## 2020-08-31 ENCOUNTER — LAB VISIT (OUTPATIENT)
Dept: LAB | Facility: HOSPITAL | Age: 50
End: 2020-08-31
Attending: FAMILY MEDICINE
Payer: COMMERCIAL

## 2020-08-31 VITALS
DIASTOLIC BLOOD PRESSURE: 70 MMHG | HEIGHT: 69 IN | TEMPERATURE: 98 F | WEIGHT: 218.25 LBS | SYSTOLIC BLOOD PRESSURE: 128 MMHG | OXYGEN SATURATION: 97 % | BODY MASS INDEX: 32.33 KG/M2 | HEART RATE: 79 BPM

## 2020-08-31 DIAGNOSIS — Z11.59 NEED FOR HEPATITIS C SCREENING TEST: ICD-10-CM

## 2020-08-31 DIAGNOSIS — K52.9 CHRONIC DIARRHEA: Primary | ICD-10-CM

## 2020-08-31 DIAGNOSIS — K52.9 CHRONIC DIARRHEA: ICD-10-CM

## 2020-08-31 PROCEDURE — 86003 ALLG SPEC IGE CRUDE XTRC EA: CPT | Mod: 59

## 2020-08-31 PROCEDURE — 86008 ALLG SPEC IGE RECOMB EA: CPT

## 2020-08-31 PROCEDURE — 3008F BODY MASS INDEX DOCD: CPT | Mod: CPTII,S$GLB,, | Performed by: FAMILY MEDICINE

## 2020-08-31 PROCEDURE — 82785 ASSAY OF IGE: CPT

## 2020-08-31 PROCEDURE — 86803 HEPATITIS C AB TEST: CPT

## 2020-08-31 PROCEDURE — 99999 PR PBB SHADOW E&M-EST. PATIENT-LVL III: CPT | Mod: PBBFAC,,, | Performed by: FAMILY MEDICINE

## 2020-08-31 PROCEDURE — 86003 ALLG SPEC IGE CRUDE XTRC EA: CPT

## 2020-08-31 PROCEDURE — 99214 OFFICE O/P EST MOD 30 MIN: CPT | Mod: S$GLB,,, | Performed by: FAMILY MEDICINE

## 2020-08-31 PROCEDURE — 99999 PR PBB SHADOW E&M-EST. PATIENT-LVL III: ICD-10-PCS | Mod: PBBFAC,,, | Performed by: FAMILY MEDICINE

## 2020-08-31 PROCEDURE — 3008F PR BODY MASS INDEX (BMI) DOCUMENTED: ICD-10-PCS | Mod: CPTII,S$GLB,, | Performed by: FAMILY MEDICINE

## 2020-08-31 PROCEDURE — 99214 PR OFFICE/OUTPT VISIT, EST, LEVL IV, 30-39 MIN: ICD-10-PCS | Mod: S$GLB,,, | Performed by: FAMILY MEDICINE

## 2020-08-31 PROCEDURE — 83516 IMMUNOASSAY NONANTIBODY: CPT | Mod: 59

## 2020-08-31 RX ORDER — OFLOXACIN 3 MG/ML
SOLUTION/ DROPS OPHTHALMIC
COMMUNITY
Start: 2020-08-25

## 2020-08-31 NOTE — PROGRESS NOTES
Subjective:       Patient ID: Juan Couch is a 49 y.o. male.    Chief Complaint: Annual Exam    49 year-old male presents today for concerns about watery stools for the last 10 years.  He had a colonoscopy that was normal other than a colon polyp. He has 4-5 bowel movements per day and he has explosive stools. He has cramping and abdominal pain. He has occasional nausea or vomiting. He has not associated this with any foods. He has not taken any medications for this thus far. Not eating makes it better. His last bowel movement was an hour ago and he hate steak and scalloped potatoes.         Past Medical History:  No date: Colon polyp  No date: Hyperparathyroidism   Past Surgical History:  2020: COLONOSCOPY; N/A      Comment:  Procedure: COLONOSCOPY;  Surgeon: Hiram Ashley MD;               Location: [facility];  Service: Endoscopy;  Laterality:                N/A;  No date: FRACTURE SURGERY      Comment:  tibia, fibula, forarm  Review of patient's family history indicates:  Problem: Cancer      Relation: Mother          Age of Onset: (Not Specified)          Comment: uterine  Problem: No Known Problems      Relation: Father          Age of Onset: (Not Specified)    Social History    Socioeconomic History      Marital status:       Spouse name: Not on file      Number of children: Not on file      Years of education: Not on file      Highest education level: Not on file    Occupational History        Comment: fire department    Social Needs      Financial resource strain: Not on file      Food insecurity        Worry: Not on file        Inability: Not on file      Transportation needs        Medical: Not on file        Non-medical: Not on file    Tobacco Use      Smoking status: Current Every Day Smoker        Packs/day: 0.50        Years: 22.00        Pack years: 11        Types: Cigarettes        Start date:         Quit date: 3/2/2020        Years since quittin.4      Smokeless tobacco:  "Never Used    Substance and Sexual Activity      Alcohol use: Yes        Alcohol/week: 0.8 standard drinks        Types: 1 Standard drinks or equivalent per week      Drug use: No      Sexual activity: Yes        Partners: Female        Birth control/protection: None    Lifestyle      Physical activity        Days per week: Not on file        Minutes per session: Not on file      Stress: Not on file    Relationships      Social connections        Talks on phone: Not on file        Gets together: Not on file        Attends Rastafari service: Not on file        Active member of club or organization: Not on file        Attends meetings of clubs or organizations: Not on file        Relationship status: Not on file    Other Topics      Concerns:        Not on file    Social History Narrative      Works as a . He has one daughter.        Review of Systems      Objective:       Vitals:    08/31/20 1506   BP: 128/70   Pulse: 79   Temp: 98.2 °F (36.8 °C)   TempSrc: Oral   SpO2: 97%   Weight: 99 kg (218 lb 4.1 oz)   Height: 5' 9" (1.753 m)       Physical Exam  Constitutional:       General: He is not in acute distress.     Appearance: He is well-developed. He is not diaphoretic.   HENT:      Head: Normocephalic.      Right Ear: External ear normal.      Left Ear: External ear normal.      Mouth/Throat:      Pharynx: No oropharyngeal exudate.   Eyes:      Conjunctiva/sclera: Conjunctivae normal.   Neck:      Musculoskeletal: Normal range of motion and neck supple.      Thyroid: No thyromegaly.   Cardiovascular:      Rate and Rhythm: Normal rate and regular rhythm.      Heart sounds: Normal heart sounds. No murmur. No friction rub. No gallop.    Pulmonary:      Effort: Pulmonary effort is normal. No respiratory distress.      Breath sounds: Normal breath sounds. No wheezing or rales.   Abdominal:      General: Bowel sounds are normal. There is no distension.      Palpations: Abdomen is soft. There is no mass.      " Tenderness: There is no abdominal tenderness. There is no guarding or rebound.   Lymphadenopathy:      Cervical: No cervical adenopathy.   Skin:     Findings: No rash.   Neurological:      Mental Status: He is alert.         Assessment:       1. Chronic diarrhea        Plan:       Juan was seen today for annual exam.    Diagnoses and all orders for this visit:    Chronic diarrhea  -     IGE; Future  -     ALLERGEN POTATO; Future  -     ALLERGEN BEEF; Future  -     ALLERGEN CHICKEN; Future  -     ALLERGEN PORK IGE; Future  -     ALLERGEN SOYBEAN; Future  -     ALLERGEN WHEAT; Future  -     Celiac Disease Panel; Future  -     Allergen, Milk Components IGE; Future  -     ALLERGEN EGG YOLK; Future  -     Calprotectin; Future  -     Stool Exam-Ova,Cysts,Parasites; Future  -     WBC, Stool; Future  -     Stool culture; Future  -     CLOSTRIDIUM DIFFICILE; Future         COLONOSCOPY WAS NORMAL OTHER THAN A POLYP. WILL CHECK FOR FOOD ALLERGIES, CELIAC DISEASE, INFECTION AND IBS.

## 2020-09-01 LAB
HCV AB SERPL QL IA: NEGATIVE
IGE SERPL-ACNC: <35 IU/ML (ref 0–100)

## 2020-09-03 ENCOUNTER — PATIENT MESSAGE (OUTPATIENT)
Dept: FAMILY MEDICINE | Facility: CLINIC | Age: 50
End: 2020-09-03

## 2020-09-03 LAB
BEEF IGE QN: <0.1 KU/L
CHICKEN CLASS: NORMAL
CHICKEN IGE QN: <0.1 KU/L
DEPRECATED BEEF IGE RAST QL: NORMAL
DEPRECATED EGG YOLK IGE RAST QL: NORMAL
DEPRECATED PORK IGE RAST QL: NORMAL
DEPRECATED POTATO IGE RAST QL: ABNORMAL
DEPRECATED SOYBEAN IGE RAST QL: ABNORMAL
DEPRECATED WHEAT IGE RAST QL: ABNORMAL
EGG YOLK IGE QN: <0.1 KU/L
GLIADIN PEPTIDE IGA SER-ACNC: 8 UNITS
GLIADIN PEPTIDE IGG SER-ACNC: 2 UNITS
IGA SERPL-MCNC: 322 MG/DL (ref 70–400)
PORK IGE QN: <0.1 KU/L
POTATO IGE QN: 0.13 KU/L
SOYBEAN IGE QN: 0.11 KU/L
TTG IGA SER-ACNC: 4 UNITS
TTG IGG SER-ACNC: 2 UNITS
WHEAT IGE QN: 0.14 KU/L

## 2020-09-08 LAB
A-LACTALB IGE QN: <0.1 KU/L
B-LACTOGLOB IGE QN: <0.1 KU/L
CASEIN IGE QN: <0.1 KU/L
COW MILK IGE QN: <0.1 KU/L
DEPRECATED MISC ALLERGEN IGE RAST QL: NORMAL

## 2020-09-21 ENCOUNTER — LAB VISIT (OUTPATIENT)
Dept: LAB | Facility: HOSPITAL | Age: 50
End: 2020-09-21
Attending: FAMILY MEDICINE
Payer: COMMERCIAL

## 2020-09-21 DIAGNOSIS — K52.9 CHRONIC DIARRHEA: ICD-10-CM

## 2020-09-21 LAB — WBC #/AREA STL HPF: NORMAL /[HPF]

## 2020-09-21 PROCEDURE — 87046 STOOL CULTR AEROBIC BACT EA: CPT | Mod: 59

## 2020-09-21 PROCEDURE — 83993 ASSAY FOR CALPROTECTIN FECAL: CPT

## 2020-09-21 PROCEDURE — 87209 SMEAR COMPLEX STAIN: CPT

## 2020-09-21 PROCEDURE — 87045 FECES CULTURE AEROBIC BACT: CPT

## 2020-09-21 PROCEDURE — 87427 SHIGA-LIKE TOXIN AG IA: CPT | Mod: 59

## 2020-09-21 PROCEDURE — 89055 LEUKOCYTE ASSESSMENT FECAL: CPT

## 2020-09-22 LAB
E COLI SXT1 STL QL IA: NEGATIVE
E COLI SXT2 STL QL IA: NEGATIVE

## 2020-09-24 LAB
BACTERIA STL CULT: NORMAL
O+P STL MICRO: NORMAL

## 2020-09-28 LAB — CALPROTECTIN STL-MCNT: <27.1 MCG/G

## 2020-12-11 ENCOUNTER — TELEPHONE (OUTPATIENT)
Dept: FAMILY MEDICINE | Facility: CLINIC | Age: 50
End: 2020-12-11

## 2020-12-11 DIAGNOSIS — K52.9 CHRONIC DIARRHEA: Primary | ICD-10-CM

## 2020-12-11 NOTE — TELEPHONE ENCOUNTER
Patient states he need [facility] for [facility] stating have IBS   Last Office Visit Info:   The patient's last visit with Odessa Santana MD was on 8/31/2020.    The patient's last visit in current department was on 8/31/2020.        Last CBC Results:   Lab Results   Component Value Date    WBC 6.73 11/05/2019    HGB 13.7 (L) 11/05/2019    HCT 43.4 11/05/2019     11/05/2019       Last CMP Results  Lab Results   Component Value Date     11/05/2019    K 5.1 11/05/2019     11/05/2019    CO2 30 (H) 11/05/2019    BUN 11 11/05/2019    CREATININE 0.9 11/05/2019    CALCIUM 11.3 (H) 11/05/2019    ALBUMIN 4.0 11/05/2019    AST 16 11/05/2019    ALT 25 11/05/2019       Last Lipids  Lab Results   Component Value Date    CHOL 162 11/05/2019    TRIG 112 11/05/2019    HDL 41 11/05/2019    LDLCALC 98.6 11/05/2019       Last A1C  No results found for: HGBA1C    Last TSH  Lab Results   Component Value Date    TSH 1.895 12/01/2017         Current Med Refills  Medication List with Changes/Refills   Current Medications    OFLOXACIN (OCUFLOX) 0.3 % OPHTHALMIC SOLUTION    INT 10 GTS QD AEA FOR 7 DAYS       Start Date: 8/25/2020 End Date: --       Order(s) placed per written order guidelines:     Please advise.

## 2020-12-11 NOTE — TELEPHONE ENCOUNTER
----- Message from Whitney Kelley sent at 12/11/2020 10:52 AM CST -----  Type: Patient Call Back    Who called: self    What is the request in detail: patient would like to have Dr Anderson to call him patient stated that he needs a diagnosis. Please call     Can the clinic reply by [facility]? no    Would the patient rather a call back or a response via [facility]? call    Best call back number:[phone]

## 2020-12-15 NOTE — TELEPHONE ENCOUNTER
I am going to have him see GI to confirm this diagnosis and then I can write this up for him. His referral is in.

## 2020-12-18 ENCOUNTER — LAB VISIT (OUTPATIENT)
Dept: PRIMARY CARE CLINIC | Facility: OTHER | Age: 50
End: 2020-12-18
Attending: INTERNAL MEDICINE
Payer: OTHER MISCELLANEOUS

## 2020-12-18 DIAGNOSIS — Z03.818 ENCOUNTER FOR OBSERVATION FOR SUSPECTED EXPOSURE TO OTHER BIOLOGICAL AGENTS RULED OUT: ICD-10-CM

## 2020-12-18 PROCEDURE — U0003 INFECTIOUS AGENT DETECTION BY NUCLEIC ACID (DNA OR RNA); SEVERE ACUTE RESPIRATORY SYNDROME CORONAVIRUS 2 (SARS-COV-2) (CORONAVIRUS DISEASE [COVID-19]), AMPLIFIED PROBE TECHNIQUE, MAKING USE OF HIGH THROUGHPUT TECHNOLOGIES AS DESCRIBED BY CMS-2020-01-R: HCPCS

## 2020-12-19 LAB — SARS-COV-2 RNA RESP QL NAA+PROBE: NOT DETECTED

## 2020-12-28 ENCOUNTER — PATIENT OUTREACH (OUTPATIENT)
Dept: ADMINISTRATIVE | Facility: OTHER | Age: 50
End: 2020-12-28

## 2020-12-28 NOTE — PROGRESS NOTES
LINKS immunization registry not responding  Care Everywhere updated  Health Maintenance updated  Chart reviewed for overdue Proactive [facility] Encounters ([facility]) health maintenance testing (CRS, Breast Ca, Diabetic Eye Exam)   Orders entered:N/A

## 2020-12-31 ENCOUNTER — OFFICE VISIT (OUTPATIENT)
Dept: GASTROENTEROLOGY | Facility: CLINIC | Age: 50
End: 2020-12-31
Payer: COMMERCIAL

## 2020-12-31 VITALS
HEART RATE: 80 BPM | BODY MASS INDEX: 32.3 KG/M2 | WEIGHT: 218.69 LBS | DIASTOLIC BLOOD PRESSURE: 85 MMHG | SYSTOLIC BLOOD PRESSURE: 124 MMHG

## 2020-12-31 DIAGNOSIS — R10.30 LOWER ABDOMINAL PAIN: ICD-10-CM

## 2020-12-31 DIAGNOSIS — Z86.010 HISTORY OF COLON POLYPS: ICD-10-CM

## 2020-12-31 DIAGNOSIS — K52.9 CHRONIC DIARRHEA: Primary | ICD-10-CM

## 2020-12-31 DIAGNOSIS — R14.0 ABDOMINAL BLOATING: ICD-10-CM

## 2020-12-31 DIAGNOSIS — K58.0 IRRITABLE BOWEL SYNDROME WITH DIARRHEA: ICD-10-CM

## 2020-12-31 PROCEDURE — 3008F BODY MASS INDEX DOCD: CPT | Mod: CPTII,S$GLB,, | Performed by: NURSE PRACTITIONER

## 2020-12-31 PROCEDURE — 99204 OFFICE O/P NEW MOD 45 MIN: CPT | Mod: S$GLB,,, | Performed by: NURSE PRACTITIONER

## 2020-12-31 PROCEDURE — 3008F PR BODY MASS INDEX (BMI) DOCUMENTED: ICD-10-PCS | Mod: CPTII,S$GLB,, | Performed by: NURSE PRACTITIONER

## 2020-12-31 PROCEDURE — 99204 PR OFFICE/OUTPT VISIT, NEW, LEVL IV, 45-59 MIN: ICD-10-PCS | Mod: S$GLB,,, | Performed by: NURSE PRACTITIONER

## 2020-12-31 PROCEDURE — 99999 PR PBB SHADOW E&M-EST. PATIENT-LVL III: CPT | Mod: PBBFAC,,, | Performed by: NURSE PRACTITIONER

## 2020-12-31 PROCEDURE — 99999 PR PBB SHADOW E&M-EST. PATIENT-LVL III: ICD-10-PCS | Mod: PBBFAC,,, | Performed by: NURSE PRACTITIONER

## 2020-12-31 RX ORDER — DICYCLOMINE HYDROCHLORIDE 10 MG/1
10 CAPSULE ORAL 4 TIMES DAILY
Qty: 60 CAPSULE | Refills: 1 | Status: SHIPPED | OUTPATIENT
Start: 2020-12-31 | End: 2021-01-30

## 2020-12-31 RX ORDER — CLOTRIMAZOLE 1 G/ML
SOLUTION TOPICAL
COMMUNITY
Start: 2020-11-16

## 2020-12-31 NOTE — PATIENT INSTRUCTIONS
1. Chronic diarrhea    2. Irritable bowel syndrome with diarrhea    3. Abdominal bloating    4. Lower abdominal pain    5. History of colon polyps      Mr. Couch was seen today for the above diagnoses.

## 2020-12-31 NOTE — PROGRESS NOTES
GASTROENTEROLOGY CLINIC NOTE    Chief Complaint: The encounter diagnosis was Chronic diarrhea.  Referring provider/PCP: Odessa Santana MD    HPI:  Juan Couch is a 50 y.o. male who is a new patient to me with a PMH that is significant for Colon polyp and Hyperparathyroidism.  He is here today to establish care for diarrhea and abdominal cramping.  This is not a new problem and has been ongoing for several years according to patient.  He reports having 5 BMs per day that are liquid with some formed chunks.  Denies hematochezia, melena, mucus, or steatorrhea.  BMs occur shortly after eating.  He endorses lower abdominal cramping and urgency prior to BMs that is relieved after having a BM.  Denies constipation or changes in bowel habits. Denies unexplained weight loss, nocturnal symptoms, or fecal incontinence.  He has not tried anything for the diarrhea. He has went to his PCP Dr. Santana for this problem.  She ordered stool workup and celiac panel which were within normal limits.  Patient also had colonoscopy which only revealed a polyp.      Prior Upper Endoscopy: None  Prior Colonoscopy: 1/2020   Findings: A 7 mm polyp was found in the sigmoid colon. The polyp was sessile. The polyp was removed with a cold snare. Resection and retrieval were complete. A few small-mouthed diverticula were found in the sigmoid colon.     Impression: - One 7 mm polyp in the sigmoid colon, removed with a cold snare. Resected and retrieved.                        - Diverticulosis in the sigmoid colon.     Recommendation:                           - Resume previous diet indefinitely.                        - Await pathology results.                        - Repeat colonoscopy in 5 years for surveillance.                        - Return to primary care physician as previously scheduled.     Pathology:  Sigmoid colon polyp, biopsy:  -Colonic mucosa with superficial hyperplastic changes and prominent lymphoid aggregate.    Family h/o  Colon Cancer: No  Family h/o Crohn's Disease or Ulcerative Colitis:  Abdominal Surgeries:    GI ROS:  Reflux: No  Dysphagia: No   Constipation: No  Diarrhea: No  Rectal bleeding/Melena/hematemesis: No  NSAIDs: No  Anticoagulation or Antiplatelet: No  ASA, Heparin, warfarin (Coumadin), rivaroxaban (Xarelto), dabigatran (Pradaxa), apixaban (Eliquis), enoxaparin (Lovenox), clopidogrel (Plavix)      Review of Systems   Constitutional: Negative for weight loss.   HENT: Negative for sore throat.    Eyes: Negative for blurred vision.   Respiratory: Negative for cough.    Cardiovascular: Negative for chest pain.   Gastrointestinal: Positive for abdominal pain (lower) and diarrhea. Negative for blood in stool, constipation, heartburn, melena, nausea and vomiting.   Genitourinary: Negative for dysuria.   Musculoskeletal: Negative for myalgias.   Skin: Negative for rash.   Neurological: Negative for headaches.   Endo/Heme/Allergies: Negative for environmental allergies.   Psychiatric/Behavioral: Negative for suicidal ideas. The patient is not nervous/anxious.        Past Medical History: has a past medical history of Colon polyp and Hyperparathyroidism.    Past Surgical History: has a past surgical history that includes Fracture surgery and Colonoscopy (N/A, 1/7/2020).    Family History:family history includes Cancer in his mother; No Known Problems in his father.    Allergies: Review of patient's allergies indicates:  No Known Allergies    Social History: reports that he has been smoking cigarettes. He started smoking about 35 years ago. He has a 11.00 pack-year smoking history. He has never used smokeless tobacco. He reports current alcohol use of about 0.8 standard drinks of alcohol per week. He reports that he does not use drugs.    Home medications:   Current Outpatient Medications on File Prior to Visit   Medication Sig Dispense Refill    ofloxacin (OCUFLOX) 0.3 % ophthalmic solution INT 10 GTS QD AEA FOR 7 DAYS        No current facility-administered medications on file prior to visit.        Vital signs:  /85   Pulse 80   Wt 99.2 kg (218 lb 11.1 oz)   BMI 32.30 kg/m²     Physical Exam  Vitals signs reviewed.   Constitutional:       General: He is not in acute distress.     Appearance: Normal appearance. He is not ill-appearing.   HENT:      Head: Normocephalic.   Eyes:      Comments: Wears Glasses   Cardiovascular:      Rate and Rhythm: Normal rate and regular rhythm.      Heart sounds: Normal heart sounds. No murmur.   Pulmonary:      Effort: Pulmonary effort is normal. No respiratory distress.      Breath sounds: Normal breath sounds.   Chest:      Chest wall: No tenderness.   Abdominal:      General: Bowel sounds are normal. There is no distension.      Palpations: Abdomen is soft.      Tenderness: There is no abdominal tenderness. Negative signs include Murcia's sign.      Hernia: No hernia is present.   Skin:     General: Skin is warm.   Neurological:      Mental Status: He is alert and oriented to person, place, and time.   Psychiatric:         Mood and Affect: Mood normal.         Behavior: Behavior normal.         Routine labs:  Lab Results   Component Value Date    WBC 6.73 11/05/2019    HGB 13.7 (L) 11/05/2019    HCT 43.4 11/05/2019    MCV 95 11/05/2019     11/05/2019     No results found for: INR  No results found for: IRON, FERRITIN, TIBC, FESATURATED  Lab Results   Component Value Date     11/05/2019    K 5.1 11/05/2019     11/05/2019    CO2 30 (H) 11/05/2019    BUN 11 11/05/2019    CREATININE 0.9 11/05/2019     Lab Results   Component Value Date    ALBUMIN 4.0 11/05/2019    ALT 25 11/05/2019    AST 16 11/05/2019    ALKPHOS 61 11/05/2019    BILITOT 0.6 11/05/2019     No results found for: GLUCOSE  Lab Results   Component Value Date    TSH 1.895 12/01/2017     Lab Results   Component Value Date    CALCIUM 11.3 (H) 11/05/2019    PHOS 2.5 (L) 12/13/2018      Ref. Range 9/21/2020 10:55    Calprotectin Latest Ref Range: <50 mcg/g <27.1   Stool Exam-Ova,Cysts,Parasites Unknown FINAL 09/24/2020 0011   Stool WBC Latest Ref Range: No neutrophils seen  No neutrophils seen     Component 3mo ago   Stool Culture No Salmonella,Shigella,Vibrio,Campylobacter,Yersinia isolated.      Component 3mo ago   Shiga Toxin 1 E.coli Negative    Shiga Toxin 2 E.coli Negative       Ref. Range 8/31/2020 15:36   Antigliadin Ab IgG Latest Ref Range: <20 UNITS 2   Antigliadin Abs, IgA Latest Ref Range: <20 UNITS 8   TTG IgA Latest Ref Range: <20 UNITS 4   TTG IgG Latest Ref Range: <20 UNITS 2   Immunoglobulin A (IgA) Latest Ref Range: 70 - 400 mg/dL 322       Imaging:  No image results found.      I have reviewed prior labs, imaging, and notes.      Assessment:  1. Chronic diarrhea    2. Irritable bowel syndrome with diarrhea    3. Abdominal bloating    4. Lower abdominal pain    5. History of colon polyps        Plan:  Orders Placed This Encounter    dicyclomine (BENTYL) 10 MG capsule     Bentyl 10mg QID as needed for diarrhea and abdominal cramping.    IBS-D is likely etiology for symptoms.  Patient has had full workup including stool studies, celiac testing, and colonoscopy.  All of which have been unrevealing.  Recommend symptom control for diarrhea and abdominal cramping.     Plan of care discussed with patient who is in agreement and verbalized understanding.       Follow Up: 6 weeks          GEE Cavazos,FNP-BC  [facility]

## 2020-12-31 NOTE — LETTER
December 31, 2020      Odessa Santana MD  [address] [place] [address]  [place] [address]           [facility]  [address], [address]  [place] [address]  Phone: [phone]          Patient: Juan Couch   MR Number: [id number]   YOB: 1970   Date of Visit: 12/31/2020       Dear Dr. Odessa Santana:    Thank you for referring Juan Couch to me for evaluation. Attached you will find relevant portions of my assessment and plan of care.    If you have questions, please do not hesitate to call me. I look forward to following Juan Couch along with you.    Sincerely,    Bonnie Caicedo, NP    Enclosure  CC:  No Recipients    If you would like to receive this communication electronically, please contact [email or web address] or [phone] to request more information on [employer] access.    For providers and/or their staff who would like to refer a patient to [facility], please contact us through our one-stop-shop provider referral line, [facility], at [phone].    If you feel you have received this communication in error or would no longer like to receive these types of communications, please e-mail [email or web address]

## 2021-01-19 ENCOUNTER — CLINICAL SUPPORT (OUTPATIENT)
Dept: SMOKING CESSATION | Facility: CLINIC | Age: 51
End: 2021-01-19
Payer: COMMERCIAL

## 2021-01-19 DIAGNOSIS — F17.200 NICOTINE DEPENDENCE: Primary | ICD-10-CM

## 2021-01-19 PROCEDURE — 99404 PR PREVENT COUNSEL,INDIV,60 MIN: ICD-10-PCS | Mod: S$GLB,,,

## 2021-01-19 PROCEDURE — 99999 PR PBB SHADOW E&M-EST. PATIENT-LVL I: ICD-10-PCS | Mod: PBBFAC,,,

## 2021-01-19 PROCEDURE — 99404 PREV MED CNSL INDIV APPRX 60: CPT | Mod: S$GLB,,,

## 2021-01-19 PROCEDURE — 99999 PR PBB SHADOW E&M-EST. PATIENT-LVL I: CPT | Mod: PBBFAC,,,

## 2021-01-19 RX ORDER — VARENICLINE TARTRATE 0.5 (11)-1
KIT ORAL
Qty: 53 TABLET | Refills: 0 | Status: SHIPPED | OUTPATIENT
Start: 2021-01-19 | End: 2021-02-19

## 2021-01-26 ENCOUNTER — CLINICAL SUPPORT (OUTPATIENT)
Dept: SMOKING CESSATION | Facility: CLINIC | Age: 51
End: 2021-01-26
Payer: COMMERCIAL

## 2021-01-26 DIAGNOSIS — F17.200 NICOTINE DEPENDENCE: Primary | ICD-10-CM

## 2021-01-26 PROCEDURE — 99407 PR TOBACCO USE CESSATION INTENSIVE >10 MINUTES: ICD-10-PCS | Mod: S$GLB,,,

## 2021-01-26 PROCEDURE — 99407 BEHAV CHNG SMOKING > 10 MIN: CPT | Mod: S$GLB,,,

## 2021-02-09 ENCOUNTER — CLINICAL SUPPORT (OUTPATIENT)
Dept: SMOKING CESSATION | Facility: CLINIC | Age: 51
End: 2021-02-09
Payer: COMMERCIAL

## 2021-02-09 DIAGNOSIS — F17.200 NICOTINE DEPENDENCE: Primary | ICD-10-CM

## 2021-02-09 PROCEDURE — 99999 PR PBB SHADOW E&M-EST. PATIENT-LVL I: ICD-10-PCS | Mod: PBBFAC,,,

## 2021-02-09 PROCEDURE — 99999 PR PBB SHADOW E&M-EST. PATIENT-LVL I: CPT | Mod: PBBFAC,,,

## 2021-02-09 PROCEDURE — 99402 PR PREVENT COUNSEL,INDIV,30 MIN: ICD-10-PCS | Mod: S$GLB,,,

## 2021-02-09 PROCEDURE — 99402 PREV MED CNSL INDIV APPRX 30: CPT | Mod: S$GLB,,,

## 2021-02-23 ENCOUNTER — CLINICAL SUPPORT (OUTPATIENT)
Dept: SMOKING CESSATION | Facility: CLINIC | Age: 51
End: 2021-02-23
Payer: COMMERCIAL

## 2021-02-23 DIAGNOSIS — F17.200 NICOTINE DEPENDENCE: Primary | ICD-10-CM

## 2021-02-23 PROCEDURE — 99403 PR PREVENT COUNSEL,INDIV,45 MIN: ICD-10-PCS | Mod: S$GLB,,,

## 2021-02-23 PROCEDURE — 99999 PR PBB SHADOW E&M-EST. PATIENT-LVL I: CPT | Mod: PBBFAC,,,

## 2021-02-23 PROCEDURE — 99403 PREV MED CNSL INDIV APPRX 45: CPT | Mod: S$GLB,,,

## 2021-02-23 PROCEDURE — 99999 PR PBB SHADOW E&M-EST. PATIENT-LVL I: ICD-10-PCS | Mod: PBBFAC,,,

## 2021-02-23 RX ORDER — VARENICLINE TARTRATE 1 MG/1
1 TABLET, FILM COATED ORAL 2 TIMES DAILY
Qty: 56 TABLET | Refills: 0 | Status: SHIPPED | OUTPATIENT
Start: 2021-02-23 | End: 2021-03-23

## 2021-03-09 ENCOUNTER — CLINICAL SUPPORT (OUTPATIENT)
Dept: SMOKING CESSATION | Facility: CLINIC | Age: 51
End: 2021-03-09
Payer: COMMERCIAL

## 2021-03-09 DIAGNOSIS — F17.200 NICOTINE DEPENDENCE: Primary | ICD-10-CM

## 2021-03-09 PROCEDURE — 99407 BEHAV CHNG SMOKING > 10 MIN: CPT | Mod: S$GLB,,,

## 2021-03-09 PROCEDURE — 99407 PR TOBACCO USE CESSATION INTENSIVE >10 MINUTES: ICD-10-PCS | Mod: S$GLB,,,

## 2021-03-16 ENCOUNTER — CLINICAL SUPPORT (OUTPATIENT)
Dept: SMOKING CESSATION | Facility: CLINIC | Age: 51
End: 2021-03-16
Payer: COMMERCIAL

## 2021-03-16 DIAGNOSIS — F17.200 NICOTINE DEPENDENCE: Primary | ICD-10-CM

## 2021-03-16 PROCEDURE — 99402 PREV MED CNSL INDIV APPRX 30: CPT | Mod: S$GLB,,,

## 2021-03-16 PROCEDURE — 99402 PR PREVENT COUNSEL,INDIV,30 MIN: ICD-10-PCS | Mod: S$GLB,,,

## 2021-03-16 PROCEDURE — 99999 PR PBB SHADOW E&M-EST. PATIENT-LVL I: ICD-10-PCS | Mod: PBBFAC,,,

## 2021-03-16 PROCEDURE — 99999 PR PBB SHADOW E&M-EST. PATIENT-LVL I: CPT | Mod: PBBFAC,,,

## 2021-04-13 ENCOUNTER — CLINICAL SUPPORT (OUTPATIENT)
Dept: SMOKING CESSATION | Facility: CLINIC | Age: 51
End: 2021-04-13
Payer: COMMERCIAL

## 2021-04-13 DIAGNOSIS — F17.200 NICOTINE DEPENDENCE: Primary | ICD-10-CM

## 2021-04-13 PROCEDURE — 99402 PR PREVENT COUNSEL,INDIV,30 MIN: ICD-10-PCS | Mod: S$GLB,,,

## 2021-04-13 PROCEDURE — 99999 PR PBB SHADOW E&M-EST. PATIENT-LVL I: CPT | Mod: PBBFAC,,,

## 2021-04-13 PROCEDURE — 99999 PR PBB SHADOW E&M-EST. PATIENT-LVL I: ICD-10-PCS | Mod: PBBFAC,,,

## 2021-04-13 PROCEDURE — 99402 PREV MED CNSL INDIV APPRX 30: CPT | Mod: S$GLB,,,

## 2021-04-13 RX ORDER — VARENICLINE TARTRATE 1 MG/1
1 TABLET, FILM COATED ORAL 2 TIMES DAILY
Qty: 56 TABLET | Refills: 0 | Status: SHIPPED | OUTPATIENT
Start: 2021-04-13 | End: 2021-05-13

## 2021-04-27 ENCOUNTER — CLINICAL SUPPORT (OUTPATIENT)
Dept: SMOKING CESSATION | Facility: CLINIC | Age: 51
End: 2021-04-27
Payer: COMMERCIAL

## 2021-04-27 DIAGNOSIS — F17.200 NICOTINE DEPENDENCE: Primary | ICD-10-CM

## 2021-04-27 PROCEDURE — 99407 BEHAV CHNG SMOKING > 10 MIN: CPT | Mod: S$GLB,,,

## 2021-04-27 PROCEDURE — 99407 PR TOBACCO USE CESSATION INTENSIVE >10 MINUTES: ICD-10-PCS | Mod: S$GLB,,,

## 2021-05-19 ENCOUNTER — CLINICAL SUPPORT (OUTPATIENT)
Dept: SMOKING CESSATION | Facility: CLINIC | Age: 51
End: 2021-05-19
Payer: COMMERCIAL

## 2021-05-19 DIAGNOSIS — F17.200 NICOTINE DEPENDENCE: Primary | ICD-10-CM

## 2021-05-19 PROCEDURE — 99407 BEHAV CHNG SMOKING > 10 MIN: CPT | Mod: S$GLB,,,

## 2021-05-19 PROCEDURE — 99407 PR TOBACCO USE CESSATION INTENSIVE >10 MINUTES: ICD-10-PCS | Mod: S$GLB,,,

## 2021-08-12 ENCOUNTER — TELEPHONE (OUTPATIENT)
Dept: SMOKING CESSATION | Facility: CLINIC | Age: 51
End: 2021-08-12

## 2021-08-31 ENCOUNTER — TELEPHONE (OUTPATIENT)
Dept: SMOKING CESSATION | Facility: CLINIC | Age: 51
End: 2021-08-31

## 2021-09-13 ENCOUNTER — CLINICAL SUPPORT (OUTPATIENT)
Dept: SMOKING CESSATION | Facility: CLINIC | Age: 51
End: 2021-09-13
Payer: COMMERCIAL

## 2021-09-13 DIAGNOSIS — F17.200 NICOTINE DEPENDENCE: Primary | ICD-10-CM

## 2021-09-13 PROCEDURE — 99407 PR TOBACCO USE CESSATION INTENSIVE >10 MINUTES: ICD-10-PCS | Mod: S$GLB,,,

## 2021-09-13 PROCEDURE — 99407 BEHAV CHNG SMOKING > 10 MIN: CPT | Mod: S$GLB,,,

## 2021-10-04 ENCOUNTER — PATIENT MESSAGE (OUTPATIENT)
Dept: ADMINISTRATIVE | Facility: HOSPITAL | Age: 51
End: 2021-10-04

## 2022-05-10 ENCOUNTER — TELEPHONE (OUTPATIENT)
Dept: OPHTHALMOLOGY | Facility: CLINIC | Age: 52
End: 2022-05-10
Payer: OTHER GOVERNMENT

## 2022-05-10 NOTE — TELEPHONE ENCOUNTER
----- Message from Alberta Biggs sent at 5/9/2022  4:45 PM CDT -----  Regarding: [facility]  Add to ptosis list Dec  ----- Message -----  From: Kristi Lin  Sent: 5/6/2022   4:11 PM CDT  To: [facility] Staff  Subject: [facility]: [facility]                                    ----- Message -----  From: Nicole Lucia  Sent: 5/6/2022   4:04 PM CDT  To: [facility] Clinical Support Staff  Subject: [facility]                                      Good afternoon,     Dr. Benton Flanagan would like to refer the following patient to Ophthalmology. 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 I have scanned the patients referral and records into .       Thank you,   Nicole   Rolly Olmos

## 2022-05-30 ENCOUNTER — PATIENT MESSAGE (OUTPATIENT)
Dept: ADMINISTRATIVE | Facility: HOSPITAL | Age: 52
End: 2022-05-30
Payer: OTHER GOVERNMENT

## 2022-10-04 ENCOUNTER — TELEPHONE (OUTPATIENT)
Dept: FAMILY MEDICINE | Facility: CLINIC | Age: 52
End: 2022-10-04
Payer: COMMERCIAL

## 2022-10-04 DIAGNOSIS — Z12.5 SCREENING FOR PROSTATE CANCER: ICD-10-CM

## 2022-10-04 DIAGNOSIS — Z00.00 ANNUAL PHYSICAL EXAM: Primary | ICD-10-CM

## 2022-10-04 NOTE — TELEPHONE ENCOUNTER
----- Message from Aubrie Cerda sent at 10/4/2022  9:56 AM CDT -----  Regarding: self  [phone]  .Type: Lab    Caller is requesting to schedule their Lab appointment prior to annual appointment.    Order is not listed in EPIC.  Please enter order and contact patient to schedule.    Name of Caller self    Preferred Date and Time of Labs: anytime   Date of EPP Appointment 10-14    Where would they like the lab performed? [facility]     Would the patient rather a call back or a response via [facility]?  Call back     Best Call Back Number: [phone]

## 2022-10-14 ENCOUNTER — OFFICE VISIT (OUTPATIENT)
Dept: FAMILY MEDICINE | Facility: CLINIC | Age: 52
End: 2022-10-14
Payer: COMMERCIAL

## 2022-10-14 VITALS
BODY MASS INDEX: 33.96 KG/M2 | SYSTOLIC BLOOD PRESSURE: 110 MMHG | OXYGEN SATURATION: 97 % | TEMPERATURE: 98 F | HEIGHT: 69 IN | WEIGHT: 229.25 LBS | DIASTOLIC BLOOD PRESSURE: 70 MMHG | HEART RATE: 72 BPM

## 2022-10-14 DIAGNOSIS — Z00.00 ANNUAL PHYSICAL EXAM: Primary | ICD-10-CM

## 2022-10-14 PROCEDURE — 1159F MED LIST DOCD IN RCRD: CPT | Mod: CPTII,S$GLB,, | Performed by: FAMILY MEDICINE

## 2022-10-14 PROCEDURE — 99999 PR PBB SHADOW E&M-EST. PATIENT-LVL III: CPT | Mod: PBBFAC,,, | Performed by: FAMILY MEDICINE

## 2022-10-14 PROCEDURE — 99396 PREV VISIT EST AGE 40-64: CPT | Mod: S$GLB,,, | Performed by: FAMILY MEDICINE

## 2022-10-14 PROCEDURE — 3078F PR MOST RECENT DIASTOLIC BLOOD PRESSURE < 80 MM HG: ICD-10-PCS | Mod: CPTII,S$GLB,, | Performed by: FAMILY MEDICINE

## 2022-10-14 PROCEDURE — 99999 PR PBB SHADOW E&M-EST. PATIENT-LVL III: ICD-10-PCS | Mod: PBBFAC,,, | Performed by: FAMILY MEDICINE

## 2022-10-14 PROCEDURE — 3074F SYST BP LT 130 MM HG: CPT | Mod: CPTII,S$GLB,, | Performed by: FAMILY MEDICINE

## 2022-10-14 PROCEDURE — 3074F PR MOST RECENT SYSTOLIC BLOOD PRESSURE < 130 MM HG: ICD-10-PCS | Mod: CPTII,S$GLB,, | Performed by: FAMILY MEDICINE

## 2022-10-14 PROCEDURE — 99396 PR PREVENTIVE VISIT,EST,40-64: ICD-10-PCS | Mod: S$GLB,,, | Performed by: FAMILY MEDICINE

## 2022-10-14 PROCEDURE — 3078F DIAST BP <80 MM HG: CPT | Mod: CPTII,S$GLB,, | Performed by: FAMILY MEDICINE

## 2022-10-14 PROCEDURE — 1159F PR MEDICATION LIST DOCUMENTED IN MEDICAL RECORD: ICD-10-PCS | Mod: CPTII,S$GLB,, | Performed by: FAMILY MEDICINE

## 2022-10-14 RX ORDER — CINACALCET 30 MG/1
30 TABLET, FILM COATED ORAL
COMMUNITY

## 2022-10-14 NOTE — PROGRESS NOTES
"Subjective:       Patient ID: Juan Couch is a 52 y.o. male.    Chief Complaint: Annual Exam    52-year-old male presents today for an annual checkup.  He has no concerns other than acid reflux.  He has a several days a week.  He has not taken anything for this thus far. It can be dietary related.     Review of Systems   Constitutional:  Negative for chills, fatigue, fever and unexpected weight change.   HENT:  Negative for nasal congestion, rhinorrhea, sneezing and sore throat.    Respiratory:  Negative for chest tightness, shortness of breath and wheezing.    Cardiovascular:  Negative for chest pain, palpitations and leg swelling.   Gastrointestinal:  Positive for reflux. Negative for abdominal pain, blood in stool, constipation, diarrhea, nausea and vomiting.   Genitourinary:  Negative for decreased urine volume, difficulty urinating, dysuria, frequency, hematuria and urgency.   Musculoskeletal:  Negative for arthralgias and myalgias.   Neurological:  Negative for dizziness, syncope, light-headedness and headaches.       Objective:      Vitals:    10/14/22 1526   BP: 110/70   Pulse: 72   Temp: 97.8 °F (36.6 °C)   TempSrc: Oral   SpO2: 97%   Weight: 104 kg (229 lb 4.5 oz)   Height: 5' 9" (1.753 m)       Physical Exam  Constitutional:       General: He is not in acute distress.     Appearance: Normal appearance. He is well-developed. He is not ill-appearing, toxic-appearing or diaphoretic.   HENT:      Head: Normocephalic and atraumatic.   Eyes:      Conjunctiva/sclera: Conjunctivae normal.   Neck:      Vascular: No carotid bruit.   Cardiovascular:      Rate and Rhythm: Normal rate and regular rhythm.      Heart sounds: Normal heart sounds. No murmur heard.    No friction rub. No gallop.   Pulmonary:      Effort: Pulmonary effort is normal. No respiratory distress.      Breath sounds: Normal breath sounds. No stridor. No wheezing, rhonchi or rales.   Abdominal:      General: Abdomen is flat. Bowel sounds are normal. " There is no distension.      Palpations: Abdomen is soft. There is no mass.      Tenderness: There is no abdominal tenderness. There is no guarding or rebound.      Hernia: No hernia is present.   Musculoskeletal:      Cervical back: Normal range of motion and neck supple.      Right lower leg: No edema.      Left lower leg: No edema.   Lymphadenopathy:      Cervical: No cervical adenopathy.   Neurological:      Mental Status: He is alert and oriented to person, place, and time.   Psychiatric:         Mood and Affect: Mood normal.         Behavior: Behavior normal.       Assessment:       Problem List Items Addressed This Visit    None  Visit Diagnoses       Annual physical exam    -  Primary            Plan:       Juan was seen today for annual exam.    Diagnoses and all orders for this visit:    Annual physical exam      [facility]   He admits to hypercalcemia and he states  they are planning to remove his parathyroid.

## 2022-11-04 ENCOUNTER — TELEPHONE (OUTPATIENT)
Dept: OPHTHALMOLOGY | Facility: CLINIC | Age: 52
End: 2022-11-04
Payer: COMMERCIAL

## 2022-11-04 NOTE — TELEPHONE ENCOUNTER
Called in regards to ptosis eval list no ans msg was left       ----- Message from Nicole Lucia sent at 11/3/2022  4:21 PM CDT -----  Good afternoon,     Can you please help schedule  in clinic? He has been on the waitlist since May.     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

## 2023-06-19 ENCOUNTER — OFFICE VISIT (OUTPATIENT)
Dept: FAMILY MEDICINE | Facility: CLINIC | Age: 53
End: 2023-06-19
Payer: COMMERCIAL

## 2023-06-19 VITALS
HEIGHT: 69 IN | DIASTOLIC BLOOD PRESSURE: 76 MMHG | HEART RATE: 75 BPM | SYSTOLIC BLOOD PRESSURE: 130 MMHG | WEIGHT: 225.06 LBS | BODY MASS INDEX: 33.34 KG/M2 | OXYGEN SATURATION: 96 % | TEMPERATURE: 98 F

## 2023-06-19 DIAGNOSIS — H91.90 HEARING LOSS, UNSPECIFIED HEARING LOSS TYPE, UNSPECIFIED LATERALITY: ICD-10-CM

## 2023-06-19 DIAGNOSIS — H93.19 TINNITUS, UNSPECIFIED LATERALITY: Primary | ICD-10-CM

## 2023-06-19 PROCEDURE — 3075F SYST BP GE 130 - 139MM HG: CPT | Mod: CPTII,S$GLB,, | Performed by: FAMILY MEDICINE

## 2023-06-19 PROCEDURE — 3075F PR MOST RECENT SYSTOLIC BLOOD PRESS GE 130-139MM HG: ICD-10-PCS | Mod: CPTII,S$GLB,, | Performed by: FAMILY MEDICINE

## 2023-06-19 PROCEDURE — 1159F PR MEDICATION LIST DOCUMENTED IN MEDICAL RECORD: ICD-10-PCS | Mod: CPTII,S$GLB,, | Performed by: FAMILY MEDICINE

## 2023-06-19 PROCEDURE — 99213 OFFICE O/P EST LOW 20 MIN: CPT | Mod: S$GLB,,, | Performed by: FAMILY MEDICINE

## 2023-06-19 PROCEDURE — 3008F BODY MASS INDEX DOCD: CPT | Mod: CPTII,S$GLB,, | Performed by: FAMILY MEDICINE

## 2023-06-19 PROCEDURE — 3008F PR BODY MASS INDEX (BMI) DOCUMENTED: ICD-10-PCS | Mod: CPTII,S$GLB,, | Performed by: FAMILY MEDICINE

## 2023-06-19 PROCEDURE — 3078F DIAST BP <80 MM HG: CPT | Mod: CPTII,S$GLB,, | Performed by: FAMILY MEDICINE

## 2023-06-19 PROCEDURE — 1159F MED LIST DOCD IN RCRD: CPT | Mod: CPTII,S$GLB,, | Performed by: FAMILY MEDICINE

## 2023-06-19 PROCEDURE — 99999 PR PBB SHADOW E&M-EST. PATIENT-LVL IV: ICD-10-PCS | Mod: PBBFAC,,, | Performed by: FAMILY MEDICINE

## 2023-06-19 PROCEDURE — 99213 PR OFFICE/OUTPT VISIT, EST, LEVL III, 20-29 MIN: ICD-10-PCS | Mod: S$GLB,,, | Performed by: FAMILY MEDICINE

## 2023-06-19 PROCEDURE — 99999 PR PBB SHADOW E&M-EST. PATIENT-LVL IV: CPT | Mod: PBBFAC,,, | Performed by: FAMILY MEDICINE

## 2023-06-19 PROCEDURE — 3078F PR MOST RECENT DIASTOLIC BLOOD PRESSURE < 80 MM HG: ICD-10-PCS | Mod: CPTII,S$GLB,, | Performed by: FAMILY MEDICINE

## 2023-06-19 RX ORDER — PANTOPRAZOLE SODIUM 20 MG/1
20 TABLET, DELAYED RELEASE ORAL
COMMUNITY
Start: 2023-01-24

## 2023-06-19 RX ORDER — VARENICLINE TARTRATE 1 MG/1
1 TABLET, FILM COATED ORAL
COMMUNITY
Start: 2023-01-24

## 2023-06-19 RX ORDER — SILDENAFIL 100 MG/1
100 TABLET, FILM COATED ORAL
COMMUNITY
Start: 2023-01-24

## 2023-06-19 NOTE — PROGRESS NOTES
"Subjective     Patient ID: Juan Couch is a 52 y.o. male.    Chief Complaint: Migraine and Dizziness (/)    52 year-old male presents with issues with vertigo and feeling unstable with walking, hearing loss in his left ear  and tinnitus. He states he is concerned about meniere's disease. He states he has had tinnitus since the 90's. He saw an ENT in 2007 at [place] for this, but doesn't recall who this is. [facility] connected tinnitus but they didn't service connect his hearing loss. He was on flight line during the [place]. He states he has been given hearing aides and was told that he would get a new set every 4 years.    Migraine   This is a chronic problem. Associated symptoms include dizziness.     Review of Systems   Neurological:  Positive for dizziness.        Objective     Vitals:    06/19/23 1604   BP: 130/76   Pulse: 75   Temp: 98.2 °F (36.8 °C)   TempSrc: Oral   SpO2: 96%   Weight: 102.1 kg (225 lb 1.4 oz)   Height: 5' 9" (1.753 m)       Physical Exam       Assessment and Plan     1. Tinnitus, unspecified laterality  -     Ambulatory referral/consult to ENT; Future; Expected date: 06/26/2023    2. Hearing loss, unspecified hearing loss type, unspecified laterality  -     Ambulatory referral/consult to ENT; Future; Expected date: 06/26/2023    REFERRAL TO ENT for hearing loss and tinnitus for a second opinion.     \         No follow-ups on file.    "

## 2023-07-13 ENCOUNTER — OFFICE VISIT (OUTPATIENT)
Dept: OPHTHALMOLOGY | Facility: CLINIC | Age: 53
End: 2023-07-13
Payer: COMMERCIAL

## 2023-07-13 ENCOUNTER — CLINICAL SUPPORT (OUTPATIENT)
Dept: OPHTHALMOLOGY | Facility: CLINIC | Age: 53
End: 2023-07-13
Payer: COMMERCIAL

## 2023-07-13 DIAGNOSIS — H57.813 BROW PTOSIS, BILATERAL: Primary | ICD-10-CM

## 2023-07-13 DIAGNOSIS — H02.413 ACQUIRED MECHANICAL PTOSIS OF BOTH EYELIDS: ICD-10-CM

## 2023-07-13 PROCEDURE — 1159F MED LIST DOCD IN RCRD: CPT | Mod: CPTII,S$GLB,, | Performed by: OPHTHALMOLOGY

## 2023-07-13 PROCEDURE — 99999 PR PBB SHADOW E&M-EST. PATIENT-LVL III: CPT | Mod: PBBFAC,,, | Performed by: OPHTHALMOLOGY

## 2023-07-13 PROCEDURE — 92285 EXTERNAL OCULAR PHOTOGRAPHY: CPT | Mod: S$GLB,,, | Performed by: OPHTHALMOLOGY

## 2023-07-13 PROCEDURE — 92285 EXTERNAL PHOTOGRAPHY - OU - BOTH EYES: ICD-10-PCS | Mod: S$GLB,,, | Performed by: OPHTHALMOLOGY

## 2023-07-13 PROCEDURE — 99999 PR PBB SHADOW E&M-EST. PATIENT-LVL I: ICD-10-PCS | Mod: PBBFAC,,,

## 2023-07-13 PROCEDURE — 99204 PR OFFICE/OUTPT VISIT, NEW, LEVL IV, 45-59 MIN: ICD-10-PCS | Mod: S$GLB,,, | Performed by: OPHTHALMOLOGY

## 2023-07-13 PROCEDURE — 1159F PR MEDICATION LIST DOCUMENTED IN MEDICAL RECORD: ICD-10-PCS | Mod: CPTII,S$GLB,, | Performed by: OPHTHALMOLOGY

## 2023-07-13 PROCEDURE — 1160F RVW MEDS BY RX/DR IN RCRD: CPT | Mod: CPTII,S$GLB,, | Performed by: OPHTHALMOLOGY

## 2023-07-13 PROCEDURE — 99999 PR PBB SHADOW E&M-EST. PATIENT-LVL III: ICD-10-PCS | Mod: PBBFAC,,, | Performed by: OPHTHALMOLOGY

## 2023-07-13 PROCEDURE — 1160F PR REVIEW ALL MEDS BY PRESCRIBER/CLIN PHARMACIST DOCUMENTED: ICD-10-PCS | Mod: CPTII,S$GLB,, | Performed by: OPHTHALMOLOGY

## 2023-07-13 PROCEDURE — 99204 OFFICE O/P NEW MOD 45 MIN: CPT | Mod: S$GLB,,, | Performed by: OPHTHALMOLOGY

## 2023-07-13 PROCEDURE — 99999 PR PBB SHADOW E&M-EST. PATIENT-LVL I: CPT | Mod: PBBFAC,,,

## 2023-07-13 NOTE — PROGRESS NOTES
HPI    Juan Couch is a/an 52 y.o. male here for ptosis.  Referred by: [facility]   How long have eyelid(s) been droopy? For about 10 years   Any h/o wearing hard CLs? no  Do eyelids feel heavy? Yes   Does the height of the eyelid(s) fluctuate throughout the day? Yes   Do eyelid(s) interfere with daily activities such as driving, reading,   working? Yes  Spontaneous orbital pain? no  Orbital pain w eye movement? no  Red eyes? yes  Red eyelids? yes  Eyelid swelling? Yes    Pt states dry eyes sometimes    Eye Meds: None     Pt states blurry vision occasionally     No double vision    Pt states peripheral vision is getting worse     Pt states some sensitivity to bright lights, prefers dim lights when   possible      Last edited by Cy Perez on 7/13/2023  2:19 PM.            Assessment /Plan     For exam results, see Encounter Report.    Brow ptosis, bilateral  -     External Photography - OU - Both Eyes    Acquired mechanical ptosis of both eyelids      The patient is a pleasant 52-year-old male here for evaluation of bilateral upper eyelid heaviness.  This has been progressive and affects activities of daily living.  This has been ongoing over the last 10 years.  The patient works as a .  The patient has noticed increased frequency of headaches along with heaviness of his brows.  No prior eyelid surgery or eye trauma.  He is referred by the [facility].      On exam, the patient has chronic frontalis use.  He has bilateral temporal brow ptosis symmetrically with the inferior aspect of the brow sitting below the frontal bone.  He has bilateral upper eyelid mechanical ptosis with skin lash touch.  He has bilateral lower eyelid dermatochalasis with herniation of orbital fat with mild to moderate lateral canthal laxity.      Pt. With significant improvement of superior visual fields with lids and brows taped vs. untaped.    These findings were discussed with the patient.      Recommend  bilateral direct temporal brow plasty and bilateral upper eyelid blepharoplasty in the minor procedure room with valium.     Informed consent obtained after extensive risks/benefits/alternatives were discussed with the patient including but not limited to pain, bleeding, infection, ocular injury, loss of the eye, asymmetry, need for revision in future, scarring.  Alternatives such as waiting were discussed.  All questions were answered.      Hold ASA, NSAIDS, and fish oil and Vit E 5 to 7 days prior to procedure.     Return for surgery

## 2023-08-24 ENCOUNTER — OFFICE VISIT (OUTPATIENT)
Dept: OTOLARYNGOLOGY | Facility: CLINIC | Age: 53
End: 2023-08-24
Payer: COMMERCIAL

## 2023-08-24 ENCOUNTER — CLINICAL SUPPORT (OUTPATIENT)
Dept: AUDIOLOGY | Facility: CLINIC | Age: 53
End: 2023-08-24
Payer: COMMERCIAL

## 2023-08-24 ENCOUNTER — TELEPHONE (OUTPATIENT)
Dept: AUDIOLOGY | Facility: CLINIC | Age: 53
End: 2023-08-24
Payer: COMMERCIAL

## 2023-08-24 VITALS — BODY MASS INDEX: 33.33 KG/M2 | WEIGHT: 225 LBS | HEIGHT: 69 IN

## 2023-08-24 DIAGNOSIS — H93.13 TINNITUS OF BOTH EARS: ICD-10-CM

## 2023-08-24 DIAGNOSIS — H90.42 SENSORINEURAL HEARING LOSS (SNHL) OF LEFT EAR WITH UNRESTRICTED HEARING OF RIGHT EAR: Primary | ICD-10-CM

## 2023-08-24 DIAGNOSIS — H90.3 SENSORINEURAL HEARING LOSS (SNHL), BILATERAL: Primary | ICD-10-CM

## 2023-08-24 DIAGNOSIS — H93.19 TINNITUS, UNSPECIFIED LATERALITY: ICD-10-CM

## 2023-08-24 DIAGNOSIS — H91.90 HEARING LOSS, UNSPECIFIED HEARING LOSS TYPE, UNSPECIFIED LATERALITY: ICD-10-CM

## 2023-08-24 PROCEDURE — 3008F BODY MASS INDEX DOCD: CPT | Mod: CPTII,S$GLB,, | Performed by: NURSE PRACTITIONER

## 2023-08-24 PROCEDURE — 92550 PR TYMPANOMETRY AND REFLEX THRESHOLD MEASUREMENTS: ICD-10-PCS | Mod: S$GLB,,,

## 2023-08-24 PROCEDURE — 1159F PR MEDICATION LIST DOCUMENTED IN MEDICAL RECORD: ICD-10-PCS | Mod: CPTII,S$GLB,, | Performed by: NURSE PRACTITIONER

## 2023-08-24 PROCEDURE — 92557 PR COMPREHENSIVE HEARING TEST: ICD-10-PCS | Mod: S$GLB,,,

## 2023-08-24 PROCEDURE — 99203 PR OFFICE/OUTPT VISIT, NEW, LEVL III, 30-44 MIN: ICD-10-PCS | Mod: S$GLB,,, | Performed by: NURSE PRACTITIONER

## 2023-08-24 PROCEDURE — 3008F PR BODY MASS INDEX (BMI) DOCUMENTED: ICD-10-PCS | Mod: CPTII,S$GLB,, | Performed by: NURSE PRACTITIONER

## 2023-08-24 PROCEDURE — 92557 COMPREHENSIVE HEARING TEST: CPT | Mod: S$GLB,,,

## 2023-08-24 PROCEDURE — 92550 TYMPANOMETRY & REFLEX THRESH: CPT | Mod: S$GLB,,,

## 2023-08-24 PROCEDURE — 99203 OFFICE O/P NEW LOW 30 MIN: CPT | Mod: S$GLB,,, | Performed by: NURSE PRACTITIONER

## 2023-08-24 PROCEDURE — 1159F MED LIST DOCD IN RCRD: CPT | Mod: CPTII,S$GLB,, | Performed by: NURSE PRACTITIONER

## 2023-08-24 NOTE — PROGRESS NOTES
Mr. Juan Couch, a 52 y.o. male, was seen in the clinic today for an audiological evaluation. Mr. Couch's main complaint was bilateral hearing loss and bilateral tinnitus, both worse in his left ear. Patient reported hearing loss in left ear started several years ago.    Otoscopy clear bilaterally. Tympanometry testing revealed Type A tympanograms bilaterally. Ipsilateral acoustic reflexes were present at 500-1000 Hz for the right ear and were present at 500-2000 Hz for the left ear.    Audiological testing revealed essentially normal hearing with a mild high frequency sensorineural hearing loss (SNHL) at 8000 Hz for the right ear and a severe to profound SNHL for the left ear. A speech reception threshold was obtained at 10 dBHL for the right ear and at 60 dBHL for the left ear. Speech discrimination was 100% for the right ear and 0% for the left ear.      Recommendations:  1. Otologic evaluation  2. Annual audiological evaluation or sooner if hearing changes  3. Hearing protection when in noise   4. Hearing aid consultation following medical clearance   5. Utilize [email or web address] Relief katrin and other tinnitus management strategies discussed during appointment (lower salt/caffeine intake, monitor stress/anxiety levels, soft background noise in quiet)    Please click on link to view Audiogram:  Document on 8/24/2023 10:20 AM by Lauren Dawkins AU.D: Audiogram

## 2023-10-02 NOTE — PROGRESS NOTES
OTOLARYNGOLOGY CLINIC NOTE  Date:  10/02/2023     Chief complaint:  Chief Complaint   Patient presents with    Cerumen Impaction     Left ear    Tinnitus     Since 1990       History of Present Illness  Juan Couch is a 52 y.o. male  presenting today for evaluation of hearing loss and tinnitus.  Pt reports having hearing loss and tinnitus since the early 90s but over the past couple of years his hearing has been worse in the left ear.      Review of medical records and prior documentation  Past medical records were reviewed with data pertinent to the chief complaint summarized in the HPI. Information obtained from review of medical records is attributed to respective sources in the HPI with reference to sources of information at their mention. Records reviewed included all recent notes from referring provider, primary care, and related subspecialty evaluations as available. This review of records was performed and additional data obtained to supplement history obtained from the patient and further inform medical decision making involved in formulating a plan of care accounting for all history and treatment relevant to the issues addressed.    Past Medical History  Past Medical History:   Diagnosis Date    Colon polyp     Gall bladder polyp     Hyperparathyroidism     IBS (irritable bowel syndrome)     Tinnitus, unspecified ear         Past Surgical History  Past Surgical History:   Procedure Laterality Date    COLONOSCOPY N/A 1/7/2020    Procedure: COLONOSCOPY;  Surgeon: Hiram Ashley MD;  Location: [facility];  Service: Endoscopy;  Laterality: N/A;    FRACTURE SURGERY      tibia, fibula, forarm        Medications  Current Outpatient Medications on File Prior to Visit   Medication Sig Dispense Refill    cholecalciferol, vitamin D3, 100 mcg (4,000 unit) Tab 10 mcg.      cinacalcet (SENSIPAR) 30 MG Tab Take 30 mg by mouth daily with breakfast.      clotrimazole (LOTRIMIN) 1 % Soln APPLY MODERATE AMOUNT TOPICALLY  "TWICE A DAY FOR FUNGAL INFECTION APPLY TO INTERSPACES ANTIFUNGAL MEDICATION      ofloxacin (OCUFLOX) 0.3 % ophthalmic solution INT 10 GTS QD AEA FOR 7 DAYS      pantoprazole (PROTONIX) 20 MG tablet 20 mg.      sildenafiL (VIAGRA) 100 MG tablet 100 mg.      varenicline (CHANTIX) 1 mg Tab 1 mg.       No current facility-administered medications on file prior to visit.       Review of Systems  ROS     Social History   reports that he quit smoking about 2 months ago. His smoking use included cigarettes. He started smoking about 37 years ago. He has a 17.0 pack-year smoking history. He has never used smokeless tobacco. He reports current alcohol use of about 0.8 standard drinks of alcohol per week. He reports that he does not use drugs.     Family History  Family History   Problem Relation Age of Onset    Cancer Mother         uterine    No Known Problems Father         Physical Exam   There were no vitals filed for this visit. Body mass index is 33.23 kg/m².  Weight: 102.1 kg (225 lb)   Height: 5' 9" (175.3 cm)     GENERAL: no acute distress.  HEAD: normocephalic.   EYES: lids and lashes normal. No scleral icterus  EARS: external ear without lesion, normal pinna shape and position.  External auditory canal with normal cerumen, tympanic membrane fully visible, no perforation , no retraction. No middle ear effusion. Ossicles intact.   NOSE: external nose without significant bony abnormality  ORAL CAVITY/OROPHARYNX: tongue midline and mobile. Symmetric palate rise. Uvula midline.   NECK: trachea midline.   LYMPH NODES:No cervical lymphadenopathy.  RESPIRATORY: no stridor, no stertor. Voice normal. Respirations nonlabored.  NEURO: alert, responds to questions appropriately.   Cranial nerve exam as indicated in above sections and additionally showed facial movement symmetric with good eye closure and symmetric smile.   PSYCH:mood appropriate      Imaging:  The patient does not have any pertinent and/or recent imaging of the " head and neck.         AUDIOLOGY RESULTS  Audiometric evaluation including audiogram, tympanometry, acoustic reflexes, and speech discrimination which was performed  was personally reviewed and interpreted.  Notable findings on the audiogram were mild sensorineural hearing loss (SNHL) on left and severe to profound on right.  Tympanometry revealed Type A tympanogram on the left and Type A tympanogram on the right. Speech discrimination was 100%  on the left, and 0% on the right.   Report of the audiologist performing this audiometric testing was also reviewed    Assessment  1. Sensorineural hearing loss (SNHL), bilateral    2. Tinnitus, unspecified laterality  - Ambulatory referral/consult to ENT    3. Hearing loss, unspecified hearing loss type, unspecified laterality  - Ambulatory referral/consult to ENT       Plan:  Given left ear hearing is severe to profound hearing loss pt referred to Dr Chavarria evaluation for surgical option for hearing loss such as cochlear implant.  For tinnitus avoid caffeine, alcohol, stress and use sound machine in quiet environment.  Discussed plan of care with patient in detail and all questions answered. Patient reported understanding of plan of care.       Answers submitted by the patient for this visit:  Review of Symptoms Questionnaire  (Submitted on 8/24/2023)  Snoring?: Yes  Sleep Apnea?: Yes  Acid Reflux?: Yes  Urinating too frequently?: Yes  Muscle aches / pain?: Yes  Seasonal Allergies?: Yes  Light-headedness: Yes  None of these: Yes  decreased concentration: Yes  sleep disturbance: Yes

## 2023-10-12 ENCOUNTER — OFFICE VISIT (OUTPATIENT)
Dept: OTOLARYNGOLOGY | Facility: CLINIC | Age: 53
End: 2023-10-12
Payer: COMMERCIAL

## 2023-10-12 VITALS — WEIGHT: 224.88 LBS | BODY MASS INDEX: 33.21 KG/M2

## 2023-10-12 DIAGNOSIS — H91.92 DEAFNESS IN LEFT EAR: Primary | ICD-10-CM

## 2023-10-12 DIAGNOSIS — H93.12 TINNITUS, LEFT: ICD-10-CM

## 2023-10-12 DIAGNOSIS — Z86.69 HISTORY OF SUDDEN HEARING LOSS: ICD-10-CM

## 2023-10-12 PROCEDURE — 3008F PR BODY MASS INDEX (BMI) DOCUMENTED: ICD-10-PCS | Mod: CPTII,S$GLB,, | Performed by: OTOLARYNGOLOGY

## 2023-10-12 PROCEDURE — 99213 PR OFFICE/OUTPT VISIT, EST, LEVL III, 20-29 MIN: ICD-10-PCS | Mod: S$GLB,,, | Performed by: OTOLARYNGOLOGY

## 2023-10-12 PROCEDURE — 99213 OFFICE O/P EST LOW 20 MIN: CPT | Mod: S$GLB,,, | Performed by: OTOLARYNGOLOGY

## 2023-10-12 PROCEDURE — 1159F PR MEDICATION LIST DOCUMENTED IN MEDICAL RECORD: ICD-10-PCS | Mod: CPTII,S$GLB,, | Performed by: OTOLARYNGOLOGY

## 2023-10-12 PROCEDURE — 1159F MED LIST DOCD IN RCRD: CPT | Mod: CPTII,S$GLB,, | Performed by: OTOLARYNGOLOGY

## 2023-10-12 PROCEDURE — 3008F BODY MASS INDEX DOCD: CPT | Mod: CPTII,S$GLB,, | Performed by: OTOLARYNGOLOGY

## 2023-10-12 NOTE — PROGRESS NOTES
Subjective     Patient ID: Juan Couch is a 52 y.o. male.    Chief Complaint: Hearing Loss (Meniere's diease )    Hearing Loss: No fever.       Juan Couch is a 52 y.o. male presents for evaluation to see if he has Meniere's disease.  Patient reports history of sudden deafness of his left ear that occurred within a 24 hour.  In 2007.  He reports intermittent bouts of brief vertigo.  Denies any history of fluctuating hearing loss vertigo lasting hours that is incapacitating.  Has chronic tinnitus in the left ear.  He has a BICROS hearing aid which he uses intermittently.  He has not had a MRI for an evaluation of the hearing loss    Review of Systems   Constitutional:  Negative for chills and fever.   HENT:  Positive for hearing loss. Negative for sore throat and trouble swallowing.    Respiratory:  Negative for apnea and chest tightness.    Cardiovascular:  Negative for chest pain.          Objective     Physical Exam  Vitals and nursing note reviewed.   Constitutional:       Appearance: Normal appearance.   HENT:      Head: Normocephalic and atraumatic.      Right Ear: Tympanic membrane, ear canal and external ear normal. There is no impacted cerumen.      Left Ear: Tympanic membrane, ear canal and external ear normal. There is no impacted cerumen.   Neurological:      Mental Status: He is alert.       Data reviewed:       Audiogram tracings independently reviewed and discussed with patient shows profound hearing loss of left ear and normal pure tone average of right ear with mild to moderate high-frequency sensorineural hearing loss from 4 K to DANA       Assessment and Plan     1. Deafness in left ear    2. History of sudden hearing loss    3. Tinnitus, left        Recommend MRI IAC to evaluate deafness of left ear., patient declined [facility]    Patient's symptoms do not meet the clinical diagnosis for Meniere's disease most likely etiology is idiopathic sudden sensorineural hearing loss left  ear  Discussed hearing rehab options including by across which the patient already has well as bone conducting implants.  Due to duration of deafness patient is not a candidate for unilateral cochlear implantation.         No follow-ups on file.

## 2023-10-12 NOTE — LETTER
October 12, 2023        Jake Burgos, TIBURCIO  [address]  [address]  [facility] [address]             [facility]  [address]  [facility] [address]  Phone: [phone]   Patient: Juan Couch   MR Number: [id number]   YOB: 1970   Date of Visit: 10/12/2023       Dear Dr. Burgos:    Thank you for referring Juan Couch to me for evaluation. Below are the relevant portions of my assessment and plan of care.            If you have questions, please do not hesitate to call me. I look forward to following Juan along with you.    Sincerely,      Master, MD Jean-Pierre           CC    No Recipients

## 2023-11-29 ENCOUNTER — TELEPHONE (OUTPATIENT)
Dept: OPHTHALMOLOGY | Facility: CLINIC | Age: 53
End: 2023-11-29
Payer: COMMERCIAL

## 2023-12-04 ENCOUNTER — PROCEDURE VISIT (OUTPATIENT)
Dept: OPHTHALMOLOGY | Facility: CLINIC | Age: 53
End: 2023-12-04
Payer: COMMERCIAL

## 2023-12-04 VITALS — SYSTOLIC BLOOD PRESSURE: 135 MMHG | DIASTOLIC BLOOD PRESSURE: 84 MMHG

## 2023-12-04 DIAGNOSIS — H02.413 ACQUIRED MECHANICAL PTOSIS OF BOTH EYELIDS: ICD-10-CM

## 2023-12-04 DIAGNOSIS — H57.813 BROW PTOSIS, BILATERAL: Primary | ICD-10-CM

## 2023-12-04 PROCEDURE — 15823 BLEPHARP UPR EYELID XCSV SKN: CPT | Mod: 50,51,S$GLB, | Performed by: OPHTHALMOLOGY

## 2023-12-04 PROCEDURE — 67900 PR REPAIR BROW PTOSIS: ICD-10-PCS | Mod: 50,S$GLB,, | Performed by: OPHTHALMOLOGY

## 2023-12-04 PROCEDURE — 99499 NO LOS: ICD-10-PCS | Mod: S$GLB,,, | Performed by: OPHTHALMOLOGY

## 2023-12-04 PROCEDURE — 99499 UNLISTED E&M SERVICE: CPT | Mod: S$GLB,,, | Performed by: OPHTHALMOLOGY

## 2023-12-04 PROCEDURE — 67900 REPAIR BROW DEFECT: CPT | Mod: 50,S$GLB,, | Performed by: OPHTHALMOLOGY

## 2023-12-04 PROCEDURE — 15823 PR REV UPPER EYELID W EXCESS SKIN: ICD-10-PCS | Mod: 50,51,S$GLB, | Performed by: OPHTHALMOLOGY

## 2023-12-04 RX ORDER — HYDROCODONE BITARTRATE AND ACETAMINOPHEN 5; 325 MG/1; MG/1
1 TABLET ORAL EVERY 6 HOURS PRN
Qty: 16 TABLET | Refills: 0 | Status: SHIPPED | OUTPATIENT
Start: 2023-12-04

## 2023-12-04 RX ORDER — NEOMYCIN SULFATE, POLYMYXIN B SULFATE, AND DEXAMETHASONE 3.5; 10000; 1 MG/G; [USP'U]/G; MG/G
OINTMENT OPHTHALMIC
Qty: 3.5 G | Refills: 3 | Status: SHIPPED | OUTPATIENT
Start: 2023-12-04

## 2023-12-04 RX ORDER — HYDROCODONE BITARTRATE AND ACETAMINOPHEN 5; 325 MG/1; MG/1
1 TABLET ORAL EVERY 6 HOURS PRN
Qty: 16 TABLET | Refills: 0 | Status: SHIPPED | OUTPATIENT
Start: 2023-12-04 | End: 2023-12-04 | Stop reason: SDUPTHER

## 2023-12-04 RX ORDER — HYDROCODONE BITARTRATE AND ACETAMINOPHEN 5; 325 MG/1; MG/1
1 TABLET ORAL EVERY 6 HOURS PRN
Qty: 16 TABLET | Refills: 0 | Status: CANCELLED | OUTPATIENT
Start: 2023-12-04

## 2023-12-04 NOTE — PROGRESS NOTES
HPI    Patient here for BILATERAL BROW / PTOSIS repair  No ASA's/NSAIDS taken in the past 7 days.   Valium taken at arrival.    Last edited by Kiersten Parra on 12/4/2023  1:21 PM.            Assessment /Plan     For exam results, see Encounter Report.    Brow ptosis, bilateral    Acquired mechanical ptosis of both eyelids    Other orders  -     neomycin-polymyxin-dexamethasone (DEXACINE) 3.5 mg/g-10,000 unit/g-0.1 % Oint; Apply to suture line 3 times daily.  Dispense: 3.5 g; Refill: 3  -     Discontinue: HYDROcodone-acetaminophen (NORCO) 5-325 mg per tablet; Take 1 tablet by mouth every 6 (six) hours as needed for Pain.  Dispense: 16 tablet; Refill: 0  -     HYDROcodone-acetaminophen (NORCO) 5-325 mg per tablet; Take 1 tablet by mouth every 6 (six) hours as needed for Pain.  Dispense: 16 tablet; Refill: 0      Procedure note:   Risks, benefits, alternatives, and complications were discussed thoroughly with the patient and the patient expressed understanding and a desire to proceed with the procedure. Informed consent was obtained, signed, and witnessed, and placed in the chart prior.    The desired level of elevation of the right brow was marked with the patient sitting up. Calipers were used to measure and john paul the natural eyelid creases of both upper eyelids, a second line 10mm inferior to both brow hairlines, and superotemporal to both brows based on location of orbital rim relative to brow. Approximately 10 cc of subcutaneous 2% lidocaine with epinephrine, bupivacaine, and Vitrase was injected adjacent to both orbital rims and into the upper eyelids. The full face was prepped and draped in sterile fashion using topical Betadine. A corneal shield was placed in both palpebral fissures.     Attention was turned to the right upper eyelid. A 4-0 silk G-3 traction suture was placed at the central gray line. The skin was incised along the markings using a #15 scalpel. A skin-only flap was raised with Kayla scissors  and Franco forceps.  Hemostasis was maintained throughout using electrocautery. The procedure was repeated for the left upper eyelid.    Attention was turned to the right brow. The epidermis, dermis and subcutaneous tissues were incised along the markings using a #15 scalpel down to the level of the frontalis fibers. Paufique forceps were used for distraction, and monopolar cautery was used to remove the tissue. The frontalis and subcutaneous tissues were closed with buried 4-0 Vicryl on a P-2 needle. Skin closure was completed using 5-0 Prolene on a P-3 with running horizontal mattress. The procedure was repeated for the left brow.    The skin incisions on the upper eyelids were closed with a running 6-0 Prolene on a P-1 needle.      The traction suture and corneal shields were removed. Ointment was applied to the eyelids. The patient tolerated the procedure well without any complications.    Post-operative instructions were given to the patient.

## 2023-12-05 NOTE — TELEPHONE ENCOUNTER
----- Message from Alberta Biggs sent at 12/5/2023  9:23 AM CST -----  Regarding: FW: Consult/Advisory  Contact: Juan Couch    ----- Message -----  From: Fidel Kirby  Sent: 12/5/2023   9:20 AM CST  To: Felix Orta Staff  Subject: Consult/Advisory                                 Consult/Advisory    Name Of Caller: Juan Couch       Contact Preference: [phone] (home)      Nature of call: Patient calling to get his Rx for HYDROcodone-acetaminophen (NORCO) 5-325 mg per tablet sent to a different pharmacy. (Pharmacy was called but dropped call)      [id number] - [place] [address] [place] [address] [address] [place] [address]  Phone: [phone] Fax: [phone]

## 2023-12-07 RX ORDER — DIAZEPAM 5 MG/1
5 TABLET ORAL
Qty: 1 TABLET | Refills: 0 | OUTPATIENT
Start: 2023-12-07

## 2023-12-11 ENCOUNTER — OFFICE VISIT (OUTPATIENT)
Dept: OPHTHALMOLOGY | Facility: CLINIC | Age: 53
End: 2023-12-11
Payer: COMMERCIAL

## 2023-12-11 DIAGNOSIS — H02.413 ACQUIRED MECHANICAL PTOSIS OF BOTH EYELIDS: ICD-10-CM

## 2023-12-11 DIAGNOSIS — Z98.890 POST-OPERATIVE STATE: Primary | ICD-10-CM

## 2023-12-11 DIAGNOSIS — H57.813 BROW PTOSIS, BILATERAL: ICD-10-CM

## 2023-12-11 PROCEDURE — 99024 POSTOP FOLLOW-UP VISIT: CPT | Mod: S$GLB,,, | Performed by: OPHTHALMOLOGY

## 2023-12-11 PROCEDURE — 1159F MED LIST DOCD IN RCRD: CPT | Mod: CPTII,S$GLB,, | Performed by: OPHTHALMOLOGY

## 2023-12-11 PROCEDURE — 99999 PR PBB SHADOW E&M-EST. PATIENT-LVL III: CPT | Mod: PBBFAC,,, | Performed by: OPHTHALMOLOGY

## 2023-12-11 PROCEDURE — 99024 PR POST-OP FOLLOW-UP VISIT: ICD-10-PCS | Mod: S$GLB,,, | Performed by: OPHTHALMOLOGY

## 2023-12-11 PROCEDURE — 99999 PR PBB SHADOW E&M-EST. PATIENT-LVL III: ICD-10-PCS | Mod: PBBFAC,,, | Performed by: OPHTHALMOLOGY

## 2023-12-11 PROCEDURE — 1159F PR MEDICATION LIST DOCUMENTED IN MEDICAL RECORD: ICD-10-PCS | Mod: CPTII,S$GLB,, | Performed by: OPHTHALMOLOGY

## 2023-12-11 PROCEDURE — 1160F PR REVIEW ALL MEDS BY PRESCRIBER/CLIN PHARMACIST DOCUMENTED: ICD-10-PCS | Mod: CPTII,S$GLB,, | Performed by: OPHTHALMOLOGY

## 2023-12-11 PROCEDURE — 92285 EXTERNAL OCULAR PHOTOGRAPHY: CPT | Mod: S$GLB,,, | Performed by: OPHTHALMOLOGY

## 2023-12-11 PROCEDURE — 92285 EXTERNAL PHOTOGRAPHY - OU - BOTH EYES: ICD-10-PCS | Mod: S$GLB,,, | Performed by: OPHTHALMOLOGY

## 2023-12-11 PROCEDURE — 1160F RVW MEDS BY RX/DR IN RCRD: CPT | Mod: CPTII,S$GLB,, | Performed by: OPHTHALMOLOGY

## 2023-12-11 RX ORDER — NEOMYCIN SULFATE, POLYMYXIN B SULFATE, AND DEXAMETHASONE 3.5; 10000; 1 MG/G; [USP'U]/G; MG/G
OINTMENT OPHTHALMIC
Qty: 3.5 G | Refills: 3 | Status: CANCELLED | OUTPATIENT
Start: 2023-12-11

## 2023-12-11 NOTE — PROGRESS NOTES
HPI    Juan Couch is a/an 53 y.o. male here for 1 week post op.  Date of procedure: 12/4/2023  Procedure: BILATERAL BROW / PTOSIS   Oral antibiotics: NONE    Eye meds: MAXITROL YULIYA   Sutures removed     Patient doing well following procedure. No pain or discomfort.     Last edited by Cy Perez on 12/11/2023  1:29 PM.            Assessment /Plan     For exam results, see Encounter Report.    Post-operative state    Brow ptosis, bilateral  -     External Photography - OU - Both Eyes    Acquired mechanical ptosis of both eyelids  -     External Photography - OU - Both Eyes      Patient doing well! Post-operative instructions reviewed. Sutures removed. All questions answered.  Return in 5 weeks prn sooner any worsening of vision/symptoms or any concerns.    Taper maxitrol unq to bid next week and then once daily the following week. Start silicone scar sheets nightly.

## 2023-12-14 RX ORDER — NEOMYCIN SULFATE, POLYMYXIN B SULFATE, AND DEXAMETHASONE 3.5; 10000; 1 MG/G; [USP'U]/G; MG/G
OINTMENT OPHTHALMIC
Qty: 1 EACH | Refills: 3 | Status: SHIPPED | OUTPATIENT
Start: 2023-12-14

## 2024-02-29 ENCOUNTER — PATIENT MESSAGE (OUTPATIENT)
Dept: FAMILY MEDICINE | Facility: CLINIC | Age: 54
End: 2024-02-29

## 2024-02-29 ENCOUNTER — OFFICE VISIT (OUTPATIENT)
Dept: FAMILY MEDICINE | Facility: CLINIC | Age: 54
End: 2024-02-29
Payer: COMMERCIAL

## 2024-02-29 ENCOUNTER — LAB VISIT (OUTPATIENT)
Dept: LAB | Facility: HOSPITAL | Age: 54
End: 2024-02-29
Attending: FAMILY MEDICINE
Payer: COMMERCIAL

## 2024-02-29 VITALS
TEMPERATURE: 98 F | RESPIRATION RATE: 16 BRPM | WEIGHT: 240.06 LBS | HEART RATE: 72 BPM | OXYGEN SATURATION: 98 % | DIASTOLIC BLOOD PRESSURE: 78 MMHG | HEIGHT: 69 IN | BODY MASS INDEX: 35.56 KG/M2 | SYSTOLIC BLOOD PRESSURE: 122 MMHG

## 2024-02-29 DIAGNOSIS — Z12.5 SCREENING FOR PROSTATE CANCER: ICD-10-CM

## 2024-02-29 DIAGNOSIS — E66.9 OBESITY (BMI 30.0-34.9): ICD-10-CM

## 2024-02-29 DIAGNOSIS — Z00.00 ANNUAL PHYSICAL EXAM: ICD-10-CM

## 2024-02-29 DIAGNOSIS — Z12.5 SCREENING FOR PROSTATE CANCER: Primary | ICD-10-CM

## 2024-02-29 LAB
ALBUMIN SERPL BCP-MCNC: 3.9 G/DL (ref 3.5–5.2)
ALP SERPL-CCNC: 53 U/L (ref 55–135)
ALT SERPL W/O P-5'-P-CCNC: 55 U/L (ref 10–44)
ANION GAP SERPL CALC-SCNC: 7 MMOL/L (ref 8–16)
AST SERPL-CCNC: 26 U/L (ref 10–40)
BASOPHILS # BLD AUTO: 0.07 K/UL (ref 0–0.2)
BASOPHILS NFR BLD: 0.9 % (ref 0–1.9)
BILIRUB SERPL-MCNC: 0.5 MG/DL (ref 0.1–1)
BUN SERPL-MCNC: 11 MG/DL (ref 6–20)
CALCIUM SERPL-MCNC: 10.3 MG/DL (ref 8.7–10.5)
CHLORIDE SERPL-SCNC: 109 MMOL/L (ref 95–110)
CHOLEST SERPL-MCNC: 156 MG/DL (ref 120–199)
CHOLEST/HDLC SERPL: 4.5 {RATIO} (ref 2–5)
CO2 SERPL-SCNC: 27 MMOL/L (ref 23–29)
COMPLEXED PSA SERPL-MCNC: 0.99 NG/ML (ref 0–4)
CREAT SERPL-MCNC: 0.8 MG/DL (ref 0.5–1.4)
DIFFERENTIAL METHOD BLD: ABNORMAL
EOSINOPHIL # BLD AUTO: 0.2 K/UL (ref 0–0.5)
EOSINOPHIL NFR BLD: 2.7 % (ref 0–8)
ERYTHROCYTE [DISTWIDTH] IN BLOOD BY AUTOMATED COUNT: 14 % (ref 11.5–14.5)
EST. GFR  (NO RACE VARIABLE): >60 ML/MIN/1.73 M^2
ESTIMATED AVG GLUCOSE: 108 MG/DL (ref 68–131)
GLUCOSE SERPL-MCNC: 96 MG/DL (ref 70–110)
HBA1C MFR BLD: 5.4 % (ref 4–5.6)
HCT VFR BLD AUTO: 43.2 % (ref 40–54)
HDLC SERPL-MCNC: 35 MG/DL (ref 40–75)
HDLC SERPL: 22.4 % (ref 20–50)
HGB BLD-MCNC: 13.7 G/DL (ref 14–18)
IMM GRANULOCYTES # BLD AUTO: 0.02 K/UL (ref 0–0.04)
IMM GRANULOCYTES NFR BLD AUTO: 0.3 % (ref 0–0.5)
LDLC SERPL CALC-MCNC: 87.2 MG/DL (ref 63–159)
LYMPHOCYTES # BLD AUTO: 2.1 K/UL (ref 1–4.8)
LYMPHOCYTES NFR BLD: 28.5 % (ref 18–48)
MCH RBC QN AUTO: 30.1 PG (ref 27–31)
MCHC RBC AUTO-ENTMCNC: 31.7 G/DL (ref 32–36)
MCV RBC AUTO: 95 FL (ref 82–98)
MONOCYTES # BLD AUTO: 0.6 K/UL (ref 0.3–1)
MONOCYTES NFR BLD: 8.2 % (ref 4–15)
NEUTROPHILS # BLD AUTO: 4.4 K/UL (ref 1.8–7.7)
NEUTROPHILS NFR BLD: 59.4 % (ref 38–73)
NONHDLC SERPL-MCNC: 121 MG/DL
NRBC BLD-RTO: 0 /100 WBC
PLATELET # BLD AUTO: 248 K/UL (ref 150–450)
PMV BLD AUTO: 11 FL (ref 9.2–12.9)
POTASSIUM SERPL-SCNC: 3.9 MMOL/L (ref 3.5–5.1)
PROT SERPL-MCNC: 7 G/DL (ref 6–8.4)
RBC # BLD AUTO: 4.55 M/UL (ref 4.6–6.2)
SODIUM SERPL-SCNC: 143 MMOL/L (ref 136–145)
TRIGL SERPL-MCNC: 169 MG/DL (ref 30–150)
WBC # BLD AUTO: 7.4 K/UL (ref 3.9–12.7)

## 2024-02-29 PROCEDURE — 3078F DIAST BP <80 MM HG: CPT | Mod: CPTII,S$GLB,, | Performed by: FAMILY MEDICINE

## 2024-02-29 PROCEDURE — 1160F RVW MEDS BY RX/DR IN RCRD: CPT | Mod: CPTII,S$GLB,, | Performed by: FAMILY MEDICINE

## 2024-02-29 PROCEDURE — 80053 COMPREHEN METABOLIC PANEL: CPT | Performed by: FAMILY MEDICINE

## 2024-02-29 PROCEDURE — 83036 HEMOGLOBIN GLYCOSYLATED A1C: CPT | Performed by: FAMILY MEDICINE

## 2024-02-29 PROCEDURE — 85025 COMPLETE CBC W/AUTO DIFF WBC: CPT | Performed by: FAMILY MEDICINE

## 2024-02-29 PROCEDURE — 3008F BODY MASS INDEX DOCD: CPT | Mod: CPTII,S$GLB,, | Performed by: FAMILY MEDICINE

## 2024-02-29 PROCEDURE — 99214 OFFICE O/P EST MOD 30 MIN: CPT | Mod: S$GLB,,, | Performed by: FAMILY MEDICINE

## 2024-02-29 PROCEDURE — 99999 PR PBB SHADOW E&M-EST. PATIENT-LVL IV: CPT | Mod: PBBFAC,,, | Performed by: FAMILY MEDICINE

## 2024-02-29 PROCEDURE — 80061 LIPID PANEL: CPT | Performed by: FAMILY MEDICINE

## 2024-02-29 PROCEDURE — 3074F SYST BP LT 130 MM HG: CPT | Mod: CPTII,S$GLB,, | Performed by: FAMILY MEDICINE

## 2024-02-29 PROCEDURE — 84153 ASSAY OF PSA TOTAL: CPT | Performed by: FAMILY MEDICINE

## 2024-02-29 PROCEDURE — 1159F MED LIST DOCD IN RCRD: CPT | Mod: CPTII,S$GLB,, | Performed by: FAMILY MEDICINE

## 2024-02-29 PROCEDURE — 36415 COLL VENOUS BLD VENIPUNCTURE: CPT | Mod: PO | Performed by: FAMILY MEDICINE

## 2024-02-29 RX ORDER — FLUTICASONE PROPIONATE 50 MCG
SPRAY, SUSPENSION (ML) NASAL
COMMUNITY
Start: 2023-11-01

## 2024-02-29 RX ORDER — ROSUVASTATIN CALCIUM 10 MG/1
10 TABLET, COATED ORAL
COMMUNITY
Start: 2023-08-16

## 2024-02-29 NOTE — PROGRESS NOTES
Health Maintenance Due   Topic     HIV Screening      COVID-19 Vaccine (8 - 2023-24 season)

## 2024-02-29 NOTE — PROGRESS NOTES
Subjective     Patient ID: Juan Couch is a 53 y.o. male.    Chief Complaint: Weight Loss    53 year old male with presents for evaluation for weight loss medication.  He is morbidly obese.  [facility]  [facility]  He states that his insurance covers semaglutide and majora for prediabetes.      Past Medical History:  No date: Colon polyp  No date: Gall bladder polyp  No date: Hyperparathyroidism  No date: IBS (irritable bowel syndrome)  No date: Tinnitus, unspecified ear   Past Surgical History:  2020: COLONOSCOPY; N/A      Comment:  Procedure: COLONOSCOPY;  Surgeon: Hiram Ashley MD;               Location: [facility];  Service: Endoscopy;  Laterality:                N/A;  No date: FRACTURE SURGERY      Comment:  tibia, fibula, forarm  Review of patient's family history indicates:  Problem: Cancer      Relation: Mother          Age of Onset: (Not Specified)          Comment: uterine  Problem: No Known Problems      Relation: Father          Age of Onset: (Not Specified)    Social History    Socioeconomic History      Marital status:     Occupational History        Comment: fire department    Tobacco Use      Smoking status: Former        Packs/day: 0.00        Years: 0.5 packs/day for 37.6 years (17.0 ttl pk-yrs)        Types: Cigarettes        Start date:         Quit date: 2023        Years since quittin.5      Smokeless tobacco: Never    Substance and Sexual Activity      Alcohol use: Yes        Alcohol/week: 0.8 standard drinks of alcohol        Types: 1 Standard drinks or equivalent per week      Drug use: No      Sexual activity: Yes        Partners: Female        Birth control/protection: None    Social History Narrative      Works as a . He has one daughter.    Social Determinants of Health  Financial Resource Strain: Medium Risk (2023)      Overall Financial Resource Strain  (CARDIA)          Difficulty of Paying Living Expenses: Somewhat hard  Food Insecurity: No Food Insecurity (12/11/2023)      Hunger Vital Sign          Worried About Running Out of Food in the Last Year: Never true          Ran Out of Food in the Last Year: Never true  Transportation Needs: No Transportation Needs (12/11/2023)      PRAPARE - Transportation          Lack of Transportation (Medical): No          Lack of Transportation (Non-Medical): No  Physical Activity: Unknown (12/11/2023)      Exercise Vital Sign          Minutes of Exercise per Session: 40 min  Stress: Stress Concern Present (12/11/2023)      [employer] - Occupational Stress Questionnaire          Feeling of Stress : Very much  Social Connections: Unknown (12/11/2023)      Social Connection and Isolation Panel [NHANES]          Frequency of Communication with Friends and Family: Twice a week          Frequency of Social Gatherings with Friends and Family: Twice a week           Active Member of Clubs or Organizations: Yes          Attends Club or Organization Meetings: Never          Marital Status:   Housing Stability: High Risk (12/11/2023)      Housing Stability Vital Sign          Unable to Pay for Housing in the Last Year: Yes          Number of Places Lived in the Last Year: 1          Unstable Housing in the Last Year: No          Review of Systems   Constitutional:  Negative for fatigue.   Respiratory:  Negative for cough, chest tightness, shortness of breath and wheezing.    Cardiovascular:  Negative for chest pain, palpitations and leg swelling.   Gastrointestinal:  Negative for abdominal pain, nausea and vomiting.   Endocrine: Positive for polydipsia. Negative for polyphagia and polyuria.   Neurological:  Negative for dizziness, syncope, light-headedness and headaches.          Objective   Vitals:    02/29/24 0740   BP: 122/78   Pulse: 72   Resp: 16   Temp: 97.7 °F (36.5 °C)   TempSrc: Oral   SpO2:  "98%   Weight: 108.9 kg (240 lb 1.3 oz)   Height: 5' 9" (1.753 m)      Physical Exam  Constitutional:       General: He is not in acute distress.     Appearance: Normal appearance. He is well-developed. He is not diaphoretic.   HENT:      Head: Normocephalic and atraumatic.      Right Ear: External ear normal.      Left Ear: External ear normal.      Mouth/Throat:      Pharynx: No oropharyngeal exudate.   Eyes:      Conjunctiva/sclera: Conjunctivae normal.   Neck:      Thyroid: No thyromegaly.   Cardiovascular:      Rate and Rhythm: Normal rate and regular rhythm.      Heart sounds: Normal heart sounds. No murmur heard.     No friction rub. No gallop.   Pulmonary:      Effort: Pulmonary effort is normal. No respiratory distress.      Breath sounds: Normal breath sounds. No stridor. No wheezing or rales.   Abdominal:      General: Bowel sounds are normal. There is no distension.      Palpations: Abdomen is soft. There is no mass.      Tenderness: There is no abdominal tenderness. There is no guarding or rebound.      Hernia: No hernia is present.   Musculoskeletal:      Cervical back: Normal range of motion and neck supple.   Lymphadenopathy:      Cervical: No cervical adenopathy.   Skin:     Findings: No rash.   Neurological:      Mental Status: He is alert.   Psychiatric:         Mood and Affect: Mood normal.            Assessment and Plan     1. Screening for prostate cancer  -     PSA, Screening; Future; Expected date: 02/29/2024    2. Annual physical exam  -     CBC Auto Differential; Future; Expected date: 02/29/2024  -     Comprehensive Metabolic Panel; Future; Expected date: 02/29/2024  -     LIPID PANEL; Future; Expected date: 02/29/2024  -     Hemoglobin A1C; Future; Expected date: 02/29/2024    3. Obesity (BMI 30.0-34.9)  -     CBC Auto Differential; Future; Expected date: 02/29/2024  -     Comprehensive Metabolic Panel; Future; Expected date: 02/29/2024  -     LIPID PANEL; Future; Expected date: " 02/29/2024  -     Hemoglobin A1C; Future; Expected date: 02/29/2024    Counseled on risk of constipation, pancreatitis (abdominal pain, nausea, or vomiting. She is to stop the medication for symptoms of pancreatitis. Advised that this is injectable once weekly to stomach, thigh, or upper arm. Will titrate up monthly as tolerated.   Will assess labs in trial Mounjaro if he is prediabetic.             No follow-ups on file.

## 2024-03-01 ENCOUNTER — PATIENT MESSAGE (OUTPATIENT)
Dept: FAMILY MEDICINE | Facility: CLINIC | Age: 54
End: 2024-03-01
Payer: COMMERCIAL

## 2024-09-09 ENCOUNTER — PATIENT MESSAGE (OUTPATIENT)
Dept: OTOLARYNGOLOGY | Facility: CLINIC | Age: 54
End: 2024-09-09
Payer: COMMERCIAL

## 2024-09-12 ENCOUNTER — OFFICE VISIT (OUTPATIENT)
Dept: FAMILY MEDICINE | Facility: CLINIC | Age: 54
End: 2024-09-12
Payer: COMMERCIAL

## 2024-09-12 VITALS
WEIGHT: 214.5 LBS | TEMPERATURE: 98 F | BODY MASS INDEX: 31.77 KG/M2 | DIASTOLIC BLOOD PRESSURE: 84 MMHG | HEART RATE: 74 BPM | SYSTOLIC BLOOD PRESSURE: 132 MMHG | HEIGHT: 69 IN | OXYGEN SATURATION: 96 %

## 2024-09-12 DIAGNOSIS — N40.1 BENIGN PROSTATIC HYPERPLASIA WITH WEAK URINARY STREAM: ICD-10-CM

## 2024-09-12 DIAGNOSIS — Z00.00 ANNUAL PHYSICAL EXAM: Primary | ICD-10-CM

## 2024-09-12 DIAGNOSIS — H90.5 SENSORINEURAL HEARING LOSS (SNHL) OF LEFT EAR, UNSPECIFIED HEARING STATUS ON CONTRALATERAL SIDE: ICD-10-CM

## 2024-09-12 DIAGNOSIS — R13.10 DYSPHAGIA, UNSPECIFIED TYPE: ICD-10-CM

## 2024-09-12 DIAGNOSIS — R39.12 BENIGN PROSTATIC HYPERPLASIA WITH WEAK URINARY STREAM: ICD-10-CM

## 2024-09-12 DIAGNOSIS — G43.809 OTHER MIGRAINE WITHOUT STATUS MIGRAINOSUS, NOT INTRACTABLE: ICD-10-CM

## 2024-09-12 PROCEDURE — 3008F BODY MASS INDEX DOCD: CPT | Mod: CPTII,S$GLB,, | Performed by: FAMILY MEDICINE

## 2024-09-12 PROCEDURE — 99396 PREV VISIT EST AGE 40-64: CPT | Mod: S$GLB,,, | Performed by: FAMILY MEDICINE

## 2024-09-12 PROCEDURE — 3075F SYST BP GE 130 - 139MM HG: CPT | Mod: CPTII,S$GLB,, | Performed by: FAMILY MEDICINE

## 2024-09-12 PROCEDURE — 1160F RVW MEDS BY RX/DR IN RCRD: CPT | Mod: CPTII,S$GLB,, | Performed by: FAMILY MEDICINE

## 2024-09-12 PROCEDURE — 3044F HG A1C LEVEL LT 7.0%: CPT | Mod: CPTII,S$GLB,, | Performed by: FAMILY MEDICINE

## 2024-09-12 PROCEDURE — 1159F MED LIST DOCD IN RCRD: CPT | Mod: CPTII,S$GLB,, | Performed by: FAMILY MEDICINE

## 2024-09-12 PROCEDURE — 99999 PR PBB SHADOW E&M-EST. PATIENT-LVL V: CPT | Mod: PBBFAC,,, | Performed by: FAMILY MEDICINE

## 2024-09-12 PROCEDURE — 3079F DIAST BP 80-89 MM HG: CPT | Mod: CPTII,S$GLB,, | Performed by: FAMILY MEDICINE

## 2024-09-12 RX ORDER — IBUPROFEN 800 MG/1
800 TABLET ORAL EVERY 6 HOURS PRN
COMMUNITY

## 2024-09-12 RX ORDER — TIRZEPATIDE 5 MG/.5ML
5 INJECTION, SOLUTION SUBCUTANEOUS WEEKLY
COMMUNITY

## 2024-09-12 RX ORDER — TAMSULOSIN HYDROCHLORIDE 0.4 MG/1
0.4 CAPSULE ORAL DAILY
COMMUNITY
Start: 2024-08-28

## 2024-09-12 NOTE — PROGRESS NOTES
Subjective     Patient ID: Juan Couch is a 53 y.o. male.    Chief Complaint: Annual Exam    53 year old female presents for an annual exam.       Past Medical History:  No date: Colon polyp  No date: Gall bladder polyp  No date: GERD (gastroesophageal reflux disease)  No date: Hx of degenerative disc disease  No date: Hyperparathyroidism  No date: IBS (irritable bowel syndrome)  No date: Osteopenia  No date: Tinnitus, unspecified ear   Past Surgical History:  2020: COLONOSCOPY; N/A      Comment:  Procedure: COLONOSCOPY;  Surgeon: Hiram Ashley MD;               Location: [facility];  Service: Endoscopy;  Laterality:                N/A;  No date: FRACTURE SURGERY      Comment:  tibia, fibula, forarm  Review of patient's family history indicates:  Problem: Cancer      Relation: Mother          Name:               Age of Onset: (Not Specified)              Comment: uterine  Problem: No Known Problems      Relation: Father          Name:               Age of Onset: (Not Specified)    Social History    Socioeconomic History      Marital status:     Occupational History        Comment: fire department    Tobacco Use      Smoking status: Former        Packs/day: 0.00        Years: 0.5 packs/day for 34.1 years (17.0 ttl pk-yrs)        Types: Cigarettes        Start date:         Quit date: 2023        Years since quittin.1      Smokeless tobacco: Never    Substance and Sexual Activity      Alcohol use: Yes        Alcohol/week: 0.8 standard drinks of alcohol        Types: 1 Standard drinks or equivalent per week      Drug use: No      Sexual activity: Yes        Partners: Female        Birth control/protection: None    Social History Narrative      Works as a . He has one daughter.    Social Determinants of Health  Financial Resource Strain: Low Risk  (2024)      Overall Financial Resource Strain (CARDIA)          Difficulty of Paying Living Expenses: Not very hard  Food Insecurity:  "Food Insecurity Present (9/6/2024)      Hunger Vital Sign          Worried About Running Out of Food in the Last Year: Never true          Ran Out of Food in the Last Year: Sometimes true  Transportation Needs: Patient Declined (4/2/2024)      Received from [facility] - Transportation          Lack of Transportation (Medical): Patient declined          Lack of Transportation (Non-Medical): Patient declined  Physical Activity: Inactive (9/6/2024)      Exercise Vital Sign          Days of Exercise per Week: 0 days          Minutes of Exercise per Session: 40 min  Stress: Stress Concern Present (9/6/2024)      [employer] - Occupational Stress Questionnaire          Feeling of Stress : Very much  Housing Stability: High Risk (12/11/2023)      Housing Stability Vital Sign          Unable to Pay for Housing in the Last Year: Yes          Number of Places Lived in the Last Year: 1          Unstable Housing in the Last Year: No          Review of Systems       Objective   Vitals:    09/12/24 0816   BP: 132/84   Pulse: 74   Temp: 98.2 °F (36.8 °C)   TempSrc: Oral   SpO2: 96%   Weight: 97.3 kg (214 lb 8.1 oz)   Height: 5' 9" (1.753 m)       Physical Exam  Constitutional:       General: He is not in acute distress.     Appearance: He is well-developed. He is not diaphoretic.   HENT:      Head: Normocephalic.      Right Ear: External ear normal.      Left Ear: External ear normal.      Mouth/Throat:      Pharynx: No oropharyngeal exudate.   Eyes:      Conjunctiva/sclera: Conjunctivae normal.   Neck:      Thyroid: No thyromegaly.   Cardiovascular:      Rate and Rhythm: Normal rate and regular rhythm.      Heart sounds: Normal heart sounds. No murmur heard.     No friction rub. No gallop.   Pulmonary:      Effort: Pulmonary effort is normal. No respiratory distress.      Breath sounds: Normal breath sounds. No stridor. No wheezing, rhonchi or rales.   Abdominal:      General: Bowel " sounds are normal. There is no distension.      Palpations: Abdomen is soft. There is no mass.      Tenderness: There is no abdominal tenderness. There is no guarding or rebound.      Hernia: No hernia is present.   Musculoskeletal:      Cervical back: Normal range of motion and neck supple.      Right lower leg: No edema.      Left lower leg: No edema.   Lymphadenopathy:      Cervical: No cervical adenopathy.   Skin:     Findings: No rash.   Neurological:      Mental Status: He is alert.   Psychiatric:         Mood and Affect: Mood normal.            Assessment and Plan     1. Annual physical exam  -     CBC Auto Differential; Future; Expected date: 09/12/2024  -     Comprehensive Metabolic Panel; Future; Expected date: 09/12/2024  -     Lipid Panel; Future; Expected date: 09/12/2024  -     Hemoglobin A1C; Future; Expected date: 09/12/2024  -     TSH; Future; Expected date: 09/12/2024  Due for labs  2. Dysphagia, unspecified type  -     Ambulatory referral/consult to Gastroenterology; Future; Expected date: 09/19/2024  -     FL Esophagram Complete; Future; Expected date: 09/12/2024  Referral to GI for EGD and swallow study  3. Other migraine without status migrainosus, not intractable  -     Ambulatory referral/consult to Neurology; Future; Expected date: 09/19/2024    4. Benign prostatic hyperplasia with weak urinary stream  On flomax for BPH    5. Sensorineural hearing loss (SNHL) of left ear, unspecified hearing status on contralateral side                 No follow-ups on file.

## 2024-09-12 NOTE — PROGRESS NOTES
Subjective     Patient ID: Juan Couch is a 53 y.o. male.    Chief Complaint: Annual Exam    53 year old male presents for an annual exam. He is s/p parathyroidectomy. His calcium and parathyroid labs are normal.     He has abdominal pain associated with the IBS.    He also has GERD. He admits to difficulty swallowing solids. He feels like things are stuck in his throat. He has acid reflux, but the protonix helps. He has issues with his throat 3-4 times a week.       Past Medical History:   Diagnosis Date    Colon polyp     Gall bladder polyp     GERD (gastroesophageal reflux disease)     Hx of degenerative disc disease     Hyperparathyroidism     IBS (irritable bowel syndrome)     Osteopenia     Tinnitus, unspecified ear       Past Surgical History:   Procedure Laterality Date    COLONOSCOPY N/A 2020    Procedure: COLONOSCOPY;  Surgeon: Hiram Ashley MD;  Location: [facility];  Service: Endoscopy;  Laterality: N/A;    FRACTURE SURGERY      tibia, fibula, forarm     Family History   Problem Relation Name Age of Onset    Cancer Mother          uterine    No Known Problems Father       Social History     Socioeconomic History    Marital status:    Occupational History     Comment: fire department   Tobacco Use    Smoking status: Former     Current packs/day: 0.00     Average packs/day: 0.5 packs/day for 34.1 years (17.0 ttl pk-yrs)     Types: Cigarettes     Start date:      Quit date: 2023     Years since quittin.1    Smokeless tobacco: Never   Substance and Sexual Activity    Alcohol use: Yes     Alcohol/week: 0.8 standard drinks of alcohol     Types: 1 Standard drinks or equivalent per week    Drug use: No    Sexual activity: Yes     Partners: Female     Birth control/protection: None   Social History Narrative    Works as a . He has one daughter.     Social Determinants of Health     Financial Resource Strain: Low Risk  (2024)    Overall Financial Resource Strain (CARDIA)     " Difficulty of Paying Living Expenses: Not very hard   Food Insecurity: Food Insecurity Present (9/6/2024)    Hunger Vital Sign     Worried About Running Out of Food in the Last Year: Never true     Ran Out of Food in the Last Year: Sometimes true   Transportation Needs: Patient Declined (4/2/2024)    Received from [facility]    PRAPARE - Transportation     Lack of Transportation (Medical): Patient declined     Lack of Transportation (Non-Medical): Patient declined   Physical Activity: Inactive (9/6/2024)    Exercise Vital Sign     Days of Exercise per Week: 0 days     Minutes of Exercise per Session: 40 min   Stress: Stress Concern Present (9/6/2024)    [employer] - Occupational Stress Questionnaire     Feeling of Stress : Very much   Housing Stability: High Risk (12/11/2023)    Housing Stability Vital Sign     Unable to Pay for Housing in the Last Year: Yes     Number of Places Lived in the Last Year: 1     Unstable Housing in the Last Year: No       Review of Systems   Respiratory:  Negative for cough, chest tightness, shortness of breath and wheezing.    Cardiovascular:  Negative for chest pain.   Gastrointestinal:  Positive for abdominal pain and reflux. Negative for diarrhea, nausea and vomiting.   Neurological:  Positive for headaches. Negative for dizziness, syncope and light-headedness.          Objective   Vitals:    09/12/24 0816   BP: 132/84   Pulse: 74   Temp: 98.2 °F (36.8 °C)   TempSrc: Oral   SpO2: 96%   Weight: 97.3 kg (214 lb 8.1 oz)   Height: 5' 9" (1.753 m)       Physical Exam  Constitutional:       General: He is not in acute distress.     Appearance: He is well-developed. He is not diaphoretic.   HENT:      Head: Normocephalic.      Right Ear: External ear normal.      Left Ear: External ear normal.      Mouth/Throat:      Pharynx: No oropharyngeal exudate.   Eyes:      Conjunctiva/sclera: Conjunctivae normal.   Neck:      Thyroid: No thyromegaly. "   Cardiovascular:      Rate and Rhythm: Normal rate and regular rhythm.      Heart sounds: Normal heart sounds. No murmur heard.     No friction rub. No gallop.   Pulmonary:      Effort: Pulmonary effort is normal. No respiratory distress.      Breath sounds: Normal breath sounds. No stridor. No wheezing, rhonchi or rales.   Abdominal:      General: Bowel sounds are normal. There is no distension.      Palpations: Abdomen is soft. There is no mass.      Tenderness: There is no abdominal tenderness. There is no guarding or rebound.      Hernia: No hernia is present.   Musculoskeletal:      Cervical back: Normal range of motion and neck supple.   Lymphadenopathy:      Cervical: No cervical adenopathy.   Skin:     Findings: No rash.   Neurological:      Mental Status: He is alert.   Psychiatric:         Mood and Affect: Mood normal.            Assessment and Plan     1. Annual physical exam  -     CBC Auto Differential; Future; Expected date: 09/12/2024  -     Comprehensive Metabolic Panel; Future; Expected date: 09/12/2024  -     Lipid Panel; Future; Expected date: 09/12/2024  -     Hemoglobin A1C; Future; Expected date: 09/12/2024  -     TSH; Future; Expected date: 09/12/2024    2. Dysphagia, unspecified type  -     Ambulatory referral/consult to Gastroenterology; Future; Expected date: 09/19/2024  -     FL Esophagram Complete; Future; Expected date: 09/12/2024    3. Other migraine without status migrainosus, not intractable  -     Ambulatory referral/consult to Neurology; Future; Expected date: 09/19/2024                 No follow-ups on file.

## 2024-09-18 ENCOUNTER — LAB VISIT (OUTPATIENT)
Dept: LAB | Facility: HOSPITAL | Age: 54
End: 2024-09-18
Attending: FAMILY MEDICINE
Payer: COMMERCIAL

## 2024-09-18 DIAGNOSIS — Z00.00 ANNUAL PHYSICAL EXAM: ICD-10-CM

## 2024-09-18 LAB
ALBUMIN SERPL BCP-MCNC: 4.1 G/DL (ref 3.5–5.2)
ALP SERPL-CCNC: 45 U/L (ref 55–135)
ALT SERPL W/O P-5'-P-CCNC: 29 U/L (ref 10–44)
ANION GAP SERPL CALC-SCNC: 8 MMOL/L (ref 8–16)
AST SERPL-CCNC: 19 U/L (ref 10–40)
BASOPHILS # BLD AUTO: 0.06 K/UL (ref 0–0.2)
BASOPHILS NFR BLD: 0.7 % (ref 0–1.9)
BILIRUB SERPL-MCNC: 0.8 MG/DL (ref 0.1–1)
BUN SERPL-MCNC: 12 MG/DL (ref 6–20)
CALCIUM SERPL-MCNC: 9.7 MG/DL (ref 8.7–10.5)
CHLORIDE SERPL-SCNC: 105 MMOL/L (ref 95–110)
CHOLEST SERPL-MCNC: 148 MG/DL (ref 120–199)
CHOLEST/HDLC SERPL: 4.1 {RATIO} (ref 2–5)
CO2 SERPL-SCNC: 26 MMOL/L (ref 23–29)
CREAT SERPL-MCNC: 1 MG/DL (ref 0.5–1.4)
DIFFERENTIAL METHOD BLD: NORMAL
EOSINOPHIL # BLD AUTO: 0.2 K/UL (ref 0–0.5)
EOSINOPHIL NFR BLD: 2.5 % (ref 0–8)
ERYTHROCYTE [DISTWIDTH] IN BLOOD BY AUTOMATED COUNT: 13.4 % (ref 11.5–14.5)
EST. GFR  (NO RACE VARIABLE): >60 ML/MIN/1.73 M^2
ESTIMATED AVG GLUCOSE: 100 MG/DL (ref 68–131)
GLUCOSE SERPL-MCNC: 91 MG/DL (ref 70–110)
HBA1C MFR BLD: 5.1 % (ref 4–5.6)
HCT VFR BLD AUTO: 46.9 % (ref 40–54)
HDLC SERPL-MCNC: 36 MG/DL (ref 40–75)
HDLC SERPL: 24.3 % (ref 20–50)
HGB BLD-MCNC: 15.5 G/DL (ref 14–18)
IMM GRANULOCYTES # BLD AUTO: 0.03 K/UL (ref 0–0.04)
IMM GRANULOCYTES NFR BLD AUTO: 0.3 % (ref 0–0.5)
LDLC SERPL CALC-MCNC: 84 MG/DL (ref 63–159)
LYMPHOCYTES # BLD AUTO: 2.1 K/UL (ref 1–4.8)
LYMPHOCYTES NFR BLD: 23.3 % (ref 18–48)
MCH RBC QN AUTO: 31 PG (ref 27–31)
MCHC RBC AUTO-ENTMCNC: 33 G/DL (ref 32–36)
MCV RBC AUTO: 94 FL (ref 82–98)
MONOCYTES # BLD AUTO: 0.7 K/UL (ref 0.3–1)
MONOCYTES NFR BLD: 7.6 % (ref 4–15)
NEUTROPHILS # BLD AUTO: 6 K/UL (ref 1.8–7.7)
NEUTROPHILS NFR BLD: 65.6 % (ref 38–73)
NONHDLC SERPL-MCNC: 112 MG/DL
NRBC BLD-RTO: 0 /100 WBC
PLATELET # BLD AUTO: 270 K/UL (ref 150–450)
PMV BLD AUTO: 10.9 FL (ref 9.2–12.9)
POTASSIUM SERPL-SCNC: 4.1 MMOL/L (ref 3.5–5.1)
PROT SERPL-MCNC: 7.4 G/DL (ref 6–8.4)
RBC # BLD AUTO: 5 M/UL (ref 4.6–6.2)
SODIUM SERPL-SCNC: 139 MMOL/L (ref 136–145)
TRIGL SERPL-MCNC: 140 MG/DL (ref 30–150)
TSH SERPL DL<=0.005 MIU/L-ACNC: 2.08 UIU/ML (ref 0.4–4)
WBC # BLD AUTO: 9.08 K/UL (ref 3.9–12.7)

## 2024-09-18 PROCEDURE — 85025 COMPLETE CBC W/AUTO DIFF WBC: CPT | Performed by: FAMILY MEDICINE

## 2024-09-18 PROCEDURE — 83036 HEMOGLOBIN GLYCOSYLATED A1C: CPT | Performed by: FAMILY MEDICINE

## 2024-09-18 PROCEDURE — 80053 COMPREHEN METABOLIC PANEL: CPT | Performed by: FAMILY MEDICINE

## 2024-09-18 PROCEDURE — 36415 COLL VENOUS BLD VENIPUNCTURE: CPT | Mod: PO | Performed by: FAMILY MEDICINE

## 2024-09-18 PROCEDURE — 80061 LIPID PANEL: CPT | Performed by: FAMILY MEDICINE

## 2024-09-18 PROCEDURE — 84443 ASSAY THYROID STIM HORMONE: CPT | Performed by: FAMILY MEDICINE

## 2024-09-24 ENCOUNTER — PATIENT MESSAGE (OUTPATIENT)
Dept: FAMILY MEDICINE | Facility: CLINIC | Age: 54
End: 2024-09-24
Payer: COMMERCIAL

## 2024-09-25 RX ORDER — TIRZEPATIDE 5 MG/.5ML
5 INJECTION, SOLUTION SUBCUTANEOUS WEEKLY
Qty: 2 ML | Refills: 5 | Status: SHIPPED | OUTPATIENT
Start: 2024-09-25 | End: 2024-09-27

## 2024-09-25 NOTE — TELEPHONE ENCOUNTER
No care due was identified.  Health [facility] Embedded Care Due Messages. Reference number: [id number].   9/25/2024 10:07:52 AM CDT

## 2024-09-26 ENCOUNTER — PATIENT MESSAGE (OUTPATIENT)
Dept: FAMILY MEDICINE | Facility: CLINIC | Age: 54
End: 2024-09-26
Payer: COMMERCIAL

## 2024-09-27 ENCOUNTER — OFFICE VISIT (OUTPATIENT)
Dept: NEUROLOGY | Facility: CLINIC | Age: 54
End: 2024-09-27
Payer: COMMERCIAL

## 2024-09-27 ENCOUNTER — TELEPHONE (OUTPATIENT)
Dept: FAMILY MEDICINE | Facility: CLINIC | Age: 54
End: 2024-09-27
Payer: COMMERCIAL

## 2024-09-27 ENCOUNTER — PATIENT MESSAGE (OUTPATIENT)
Dept: NEUROLOGY | Facility: CLINIC | Age: 54
End: 2024-09-27

## 2024-09-27 DIAGNOSIS — G43.009 MIGRAINE WITHOUT AURA AND WITHOUT STATUS MIGRAINOSUS, NOT INTRACTABLE: Primary | ICD-10-CM

## 2024-09-27 RX ORDER — RIZATRIPTAN BENZOATE 10 MG/1
TABLET ORAL
Qty: 8 TABLET | Refills: 5 | Status: SHIPPED | OUTPATIENT
Start: 2024-09-27

## 2024-09-27 NOTE — TELEPHONE ENCOUNTER
Patient is requesting a refill for the tirzepatide compound to be sent to [facility] please.     Last Office Visit Info:   The patient's last visit with Odessa Santana MD was on 9/12/2024.

## 2024-09-27 NOTE — TELEPHONE ENCOUNTER
----- Message from Desiree Paez sent at 9/27/2024  4:03 PM CDT -----  Name of Who is calling :JAMAL FERRARI [[id number]]        What is the request in detail:Pt would like to be called the semigluchide medication. Please asssist         Can the clinic reply by [facility]:no            [facility]: [phone]

## 2024-09-27 NOTE — PATIENT INSTRUCTIONS
Your Migraine Tool Kit   Prevention Daily use N/a   Rescue As needed At the onset of aura or headache Maxalt (rizatriptan)     At the onset of nausea N/a     Non-Medication Migraine Treatments   Nutraceutical Options  Magnesium Oxide - 400 mg daily    Riboflavin (Vitamin B2) - 400 mg daily    Coenzyme Q10 - 200 mg daily   Neuromodulation Devices Cefaly - [email or web address]     Nerivio - [email or web address]    Nexant Core - [email or web address]   Behavioral Treatment Relaxation  [email or web address]    Guided Meditation  [email or web address]    Biofeedback  [email or web address]   Topical Options Tiger Balm, Vicks VapoRub, Migrastick, Essential oils   Cooling Caps See below examples

## 2024-09-27 NOTE — PROGRESS NOTES
Patient ID: [id number]  The patient location is: [facility]   The chief complaint leading to consultation is: Headaches    Visit type: audiovisual    Face to Face time with patient: 16    Each patient to whom he or she provides medical services by telemedicine is:  (1) informed of the relationship between the physician and patient and the respective role of any other health care provider with respect to management of the patient; and (2) notified that he or she may decline to receive medical services by telemedicine and may withdraw from such care at any time.    Notes:     Subjective:     HPI:  Juan Couch is a 53 y.o. gentleman with hyperlipidemia, PUNEET,  and migraines. he is presenting today as a new patient for evaluation and management of headaches.     Headache history  Age at onset: late 20s   Course over time: increasing in both severity and frequency  Location: left occipital  Character: throbbing and pressure  Intensity: 4-9 on a scale from 1 to 10  Frequency:  x4 per month .   Duration: 4-10 hours  Timing: more often in the morning, can wake him up in the middle of the night   Mild/moderate/severe. Work attendance or other daily activities are affected by the headaches.  Aura: not preceded by an aura  Associated symptoms: Photosensitivity and Phonophobia   Associated neurologic symptoms: dizziness  Precipitating factors: None which have been determined  Aggravating factors: Physical activity   Home treatment: Advil with some improvement.   ER visits: No  Positive Hx of: No Head trauma, has PUNEET and uses CPAP consistently   Is medication overuse contributing to the patient's current migraine burden: No      He [facility], results were normal.    Headache Medication history:  AED Neuromodulators  MAOIs  Ergot Alkaloids    Acetazolamide (Diamox) [] Phenelzine (Nardil) [] Dihydroergotamine (Migranal) []   Carbamazepine (Tegretol) [] Tranylcypromine (Parnate) [] Ergotamine  (Ergomar) []   Gabapentin (Neurontin) [] Antihistamine/Serotonergic  Triptans    Lacosamide (Vimpat) [] Cyproheptadine (Periactin) [] Almotriptan (Axert) []   Lamotrigine (Lamictal) [] Antihypertensives  Eletriptan (Relpax) []   Levatiracetam (Keppra) [] Atenolol (Tenormin) [] Frovatriptan (Frova) []   Oxcarbazepine (Trileptal) [] Bisoprostol (Zebeta) [] Naratriptan (Amerge) []   Levetiracetam (Keppra) [] Candesartan (Atacand) [] Rizatriptan (Maxalt) []   Pregabalin (Lyrica) [] Carvedilol (Coreg)  Sumatriptan (Imitrex) []   Topiramate (Topamax)  (Trokendi) [] Diltiazem (Cardizem) [] Zolmitriptan (Zomig) []   Valproic Acid (Depakote) (Divalproex Sodium) [] Lisinopril (Prinivil, Zestril) [] Combo Abortives    Zonisamide (Zonegran) [] Metoprolol (Toprol) [] BC Powder    Muscle relaxants  Nadolol (Corgard) [] Butalbital and Acetaminophen (Bupap) []   Methocarbamol (Robaxin) [] Nebivolol (Bystolic) [] Butalbital, Acetaminophen, and caffeine (Fioricet) []   Baclofen (Lioresal) [] Nicardipine (Cardene) []     Cyclobenzaprine (Flexeril) [] Nimodipine (Nimotop) [] Butalbital, Aspirin, and caffeine (Fiorinal) []   Tizanidine (Zanaflex) [] Propranolol (Inderal) []     Benzodiazepines  Telmisartan (Micardis) [] Butalbital, Caffeine, Acetaminophen, and Codeine (Fioricet with Codeine) []   Clonazepam (Klonopin) [] Timolol (Blocadren) []     Alprazolam (Xanax) [] Verapamil (Calan, Verelan) [] Butalbital, Caffeine, Aspirin, and Codeine  (Fiorinal with Codeine) []   Diazepam (Valium) [] NSAIDs      Lorazepam (Ativan) [] Acetaminophen (Tylenol) [x]     Antidepressants   Acetylsalicylic Acid (Aspirin) [] Aspirin, Caffeine, and Acetaminophen (Excedrin) (Goodys) []   Amitriptyline (Elavil) [] Celecoxib (Celebrex) []     Bupropion (Wellbutrin)  Diclofenac (Cambia) []     Citalopram (Celexa) [] Ibuprofen (Motrin, Advil) (partially effective) [x] Acetaminophen, Caffeine, Pyrilamine maleate (Midol) []   Desipramine (Norpramin) []  Indomethacin (Indocin) []     Desvenlafazine (Pristiq) [] Ketoprofen (Orudis) [] Acetaminophen, Dichloralphenazone, and Isometheptene (Midrin) []   Doxepin (Sinequan) [] Ketorolac (Toradol) []     Duloxetine (Cymbalta) [] Naproxen (Anaprox, Aleve) []     Escitalopram (Lexapro) [] Meclofenamic Acid (Meclomen) [] Procedures    Fluoxetine (Prozac) [] Meloxicam (Mobic) [] Greater occipital nerve block []   Imipramine (Tofranil) [] Monoclonals  Cervical radiofrequency ablation []   Nortriptyline (Pamelor) [] Erenumab-aooe (Aimovig) [] Spenopalatine ganglion block []   Protriptyline (Vivactil) [] Galcanezumab (Emgality) [] Occipital neuro stimulation []   Trazodone (Desyrel, Oleptro) [] Fremanazumab-vfrm (Ajovy) [] Cervical Epidural steroid injection []   Venlafaxine (Effexor) [] Eptinezumab (Vyepti) [] Facet joint injections []   Oral CGRP inhibitors  Neuromodulation devices   Transforaminal epidural steroid injection []   Atogepant (Qulipta) [] Cefaly [] Cervical medial branch blocks []   Rimegepant (Nurtec) [] Gamma Core [] Botox []   Ubrogepant (Ubrelvy) [] Nerivio [] iovera []   Zavegepant (Zavzpret) [] Transcranial Magnetic stimulation [] Antiemetics    Other    Prochlorperazine (Compazine) []   Memantine (Namenda) []   Metoclopramide (Reglan)  []       Promethazine (Phenergan)  []       Ondansetron (Zofran) []       Meclizine (Antivert, Dramamine) []     Review of Systems:  Review of Systems   HENT:  Positive for hearing loss and tinnitus (left-sided).    Eyes:  Positive for photophobia. Negative for blurred vision and double vision.   Gastrointestinal:  Positive for nausea. Negative for vomiting.   Musculoskeletal:  Negative for falls.   Neurological:  Positive for dizziness and headaches.   Psychiatric/Behavioral:  The patient does not have insomnia.       Past Medical History:  -------------------------------------    Colon polyp    Gall bladder polyp    GERD (gastroesophageal reflux disease)    Hx of  degenerative disc disease    Hyperparathyroidism    IBS (irritable bowel syndrome)    Osteopenia    Tinnitus, unspecified ear     Allergies:  Review of patient's allergies indicates:   Allergen Reactions    Atorvastatin Diarrhea       Pertinent Family History:  Family History   Problem Relation Name Age of Onset    Cancer Mother          uterine    No Known Problems Father         Pertinent Social History:  Social History     Tobacco Use    Smoking status: Former     Current packs/day: 0.00     Average packs/day: 0.5 packs/day for 34.1 years (17.0 ttl pk-yrs)     Types: Cigarettes     Start date:      Quit date: 2023     Years since quittin.1    Smokeless tobacco: Never   Substance Use Topics    Alcohol use: Yes     Alcohol/week: 0.8 standard drinks of alcohol     Types: 1 Standard drinks or equivalent per week    Drug use: No       Medications:  Current Outpatient Medications   Medication Instructions    clotrimazole (LOTRIMIN) 1 % Soln APPLY MODERATE AMOUNT TOPICALLY TWICE A DAY FOR FUNGAL INFECTION APPLY TO INTERSPACES ANTIFUNGAL MEDICATION    fluticasone propionate (FLONASE) 50 mcg/actuation nasal spray INSTILL 2 SPRAYS IN EACH NOSTRIL EVERY DAY NASAL CONGESTION    ibuprofen (ADVIL,MOTRIN) 800 mg, Oral, Every 6 hours PRN    MOUNJARO 5 mg, Subcutaneous, Weekly    pantoprazole (PROTONIX) 20 mg    rizatriptan (MAXALT) 10 MG tablet Take one tablet (10 mg) at the onset of headaches; if symptoms persist or return, may repeat dose after 2 hours. maximum dose: 20 mg per 24 hours    rosuvastatin (CRESTOR) 10 mg    sildenafiL (VIAGRA) 100 mg    tamsulosin (FLOMAX) 0.4 mg, Oral, Daily      Objective:     *exam is limited due to the virtual nature of this visit    General:  Well-appearing, well-nourished, NAD, cooperative    Neurologic Exam:   Awake, alert and oriented x3  Speech spontaneous and fluent, intact comprehension.   Adequate fund of knowledge, vocabulary.  EOM intact. No ophthalmoplegia.   Facial  expression is full and symmetric.   Hearing is intact.  Antigravity in BUE. No tremor.      Pertinent lab results  Lab Results   Component Value Date    [id number] 27 (L) 11/05/2019     Lab Results   Component Value Date    ANTIGLIADINA 8 08/31/2020    ANTIGLIADIN 2 08/31/2020    TTGIGA 4 08/31/2020    TTGIGG 2 08/31/2020    CELACIMMUNA 322 08/31/2020     Lab Results   Component Value Date    TSH 2.078 09/18/2024    WBC 9.08 09/18/2024    LYMPH 2.1 09/18/2024    LYMPH 23.3 09/18/2024    RBC 5.00 09/18/2024    HGB 15.5 09/18/2024    HCT 46.9 09/18/2024    MCV 94 09/18/2024     09/18/2024     09/18/2024    K 4.1 09/18/2024    CO2 26 09/18/2024    BUN 12 09/18/2024    CREATININE 1.0 09/18/2024    CALCIUM 9.7 09/18/2024    AST 19 09/18/2024    ALT 29 09/18/2024       Pertinent imaging results  *No images available to review in person for these studies, per radiology report:  11/22/2023  CT Temporal bone wo contrast:  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    MRI IAC/Temporal w/wo contrast:  - No abnormal mass or enhancement in the cerebellar pontine angles or along the 7th and 8th cranial nerves bilaterally.  - Chronic appearing focal fluid in the posterior right mastoid air cells.  - Thinning of the arcuate eminence bilaterally.  Superior semicircular canal dehiscence is difficult to exclude.    Other pertinent studies  None    Assessment:   Juan Couch is a 53 y.o. male with UPNEET, hyperlipidemia, and migraines who presents for management of headaches that are increasing in frequency. Characteristics meet the criteria for episodic migraines without aura. he is having <6 headache days per month therefore a daily preventative treatment is not warranted but he would benefit from natural supplements such as magnesium oxide. For rescue, we will do a trial of Maxalt (rizatriptan), he denies history of cardiac disease in self. Lastly,  he will try to upload the MRI images in his portal for review.     1. Migraine without aura and without status migrainosus, not intractable      Plan:     - rizatriptan (MAXALT) 10 MG tablet; Take one tablet (10 mg) at the onset of headaches; if symptoms persist or return, may repeat dose after 2 hours. maximum dose: 20 mg per 24 hours  Dispense: 8 tablet; Refill: 5  - Follow up: VV in 6 months to reassess       Disclaimer: This note was partly generated using dictation software which may occasionally result in transcription errors that are missed on review.      Based on our encounter today, my overall Medical Decision Making is a Level 4     Complexity of Problem: Moderate (1 or more chronic illnesses with exacerbation, progression or treatment side effects)  Complexity of Data: Moderate (Review of >3 unique test results)  Risk of Complications and/or morbidity/mortality of Management: Moderate risk (Prescription drug management)        Lucia Fatima MD    [facility]  09/27/2024

## 2024-10-08 ENCOUNTER — OFFICE VISIT (OUTPATIENT)
Dept: FAMILY MEDICINE | Facility: CLINIC | Age: 54
End: 2024-10-08
Payer: COMMERCIAL

## 2024-10-08 DIAGNOSIS — E66.811 OBESITY (BMI 30.0-34.9): Primary | ICD-10-CM

## 2024-10-08 PROCEDURE — 99213 OFFICE O/P EST LOW 20 MIN: CPT | Mod: 95,,, | Performed by: FAMILY MEDICINE

## 2024-10-08 PROCEDURE — 3044F HG A1C LEVEL LT 7.0%: CPT | Mod: CPTII,95,, | Performed by: FAMILY MEDICINE

## 2024-10-08 NOTE — PROGRESS NOTES
The patient location is: [place]  The chief complaint leading to consultation is: refill of tirzepatide    Visit type: audiovisual    Face to Face time with patient:   5 minutes of total time spent on the encounter, which includes face to face time and non-face to face time preparing to see the patient (eg, review of tests), Obtaining and/or reviewing separately obtained history, Documenting clinical information in the electronic or other health record, Independently interpreting results (not separately reported) and communicating results to the patient/family/caregiver, or Care coordination (not separately reported).         Each patient to whom he or she provides medical services by telemedicine is:  (1) informed of the relationship between the physician and patient and the respective role of any other health care provider with respect to management of the patient; and (2) notified that he or she may decline to receive medical services by telemedicine and may withdraw from such care at any time.    Notes:     Subjective     Patient ID: Juan Couch is a 53 y.o. male.    Chief Complaint: No chief complaint on file.    53 year old male presnet for refills of his tirzepatide. He is on 7.5 mg and is doing well with this dose. He ahd nausea and vomiting when starting this, but it has resolved. He has some constipation.       Review of Systems       Objective     Physical Exam       Assessment and Plan     1. Obesity (BMI 30.0-34.9)        Refilled tirzepatide 7.5 mg with 2 refills for [place]. He will let me know if he stops losing weight to increase it to 10 mg. Follow up in 3 months.          No follow-ups on file.

## 2024-10-09 ENCOUNTER — PATIENT MESSAGE (OUTPATIENT)
Dept: FAMILY MEDICINE | Facility: CLINIC | Age: 54
End: 2024-10-09
Payer: COMMERCIAL